# Patient Record
Sex: FEMALE | Race: WHITE | NOT HISPANIC OR LATINO
[De-identification: names, ages, dates, MRNs, and addresses within clinical notes are randomized per-mention and may not be internally consistent; named-entity substitution may affect disease eponyms.]

---

## 2020-02-01 ENCOUNTER — TRANSCRIPTION ENCOUNTER (OUTPATIENT)
Age: 85
End: 2020-02-01

## 2023-01-01 ENCOUNTER — TRANSCRIPTION ENCOUNTER (OUTPATIENT)
Age: 88
End: 2023-01-01

## 2023-01-01 ENCOUNTER — APPOINTMENT (OUTPATIENT)
Dept: CARDIOLOGY | Facility: CLINIC | Age: 88
End: 2023-01-01
Payer: MEDICARE

## 2023-01-01 ENCOUNTER — NON-APPOINTMENT (OUTPATIENT)
Age: 88
End: 2023-01-01

## 2023-01-01 ENCOUNTER — INPATIENT (INPATIENT)
Facility: HOSPITAL | Age: 88
LOS: 3 days | DRG: 291 | End: 2023-12-08
Attending: INTERNAL MEDICINE | Admitting: STUDENT IN AN ORGANIZED HEALTH CARE EDUCATION/TRAINING PROGRAM
Payer: MEDICARE

## 2023-01-01 ENCOUNTER — APPOINTMENT (OUTPATIENT)
Dept: CARE COORDINATION | Facility: HOME HEALTH | Age: 88
End: 2023-01-01
Payer: MEDICARE

## 2023-01-01 ENCOUNTER — INPATIENT (INPATIENT)
Facility: HOSPITAL | Age: 88
LOS: 3 days | Discharge: HOME HEALTH | DRG: 291 | End: 2023-11-03
Attending: STUDENT IN AN ORGANIZED HEALTH CARE EDUCATION/TRAINING PROGRAM | Admitting: STUDENT IN AN ORGANIZED HEALTH CARE EDUCATION/TRAINING PROGRAM
Payer: MEDICARE

## 2023-01-01 VITALS
WEIGHT: 112 LBS | HEIGHT: 58 IN | OXYGEN SATURATION: 95 % | TEMPERATURE: 98.3 F | SYSTOLIC BLOOD PRESSURE: 118 MMHG | RESPIRATION RATE: 18 BRPM | BODY MASS INDEX: 23.51 KG/M2 | DIASTOLIC BLOOD PRESSURE: 60 MMHG | HEART RATE: 70 BPM

## 2023-01-01 VITALS
RESPIRATION RATE: 18 BRPM | HEART RATE: 97 BPM | OXYGEN SATURATION: 91 % | DIASTOLIC BLOOD PRESSURE: 78 MMHG | WEIGHT: 115.08 LBS | HEIGHT: 58 IN | SYSTOLIC BLOOD PRESSURE: 131 MMHG

## 2023-01-01 VITALS
OXYGEN SATURATION: 97 % | RESPIRATION RATE: 18 BRPM | SYSTOLIC BLOOD PRESSURE: 110 MMHG | WEIGHT: 113 LBS | DIASTOLIC BLOOD PRESSURE: 62 MMHG | TEMPERATURE: 98 F | HEART RATE: 80 BPM | HEIGHT: 58 IN | BODY MASS INDEX: 23.72 KG/M2

## 2023-01-01 VITALS
HEART RATE: 78 BPM | HEIGHT: 58 IN | BODY MASS INDEX: 23.72 KG/M2 | SYSTOLIC BLOOD PRESSURE: 111 MMHG | OXYGEN SATURATION: 87 % | DIASTOLIC BLOOD PRESSURE: 68 MMHG | WEIGHT: 113 LBS

## 2023-01-01 VITALS
HEIGHT: 58 IN | SYSTOLIC BLOOD PRESSURE: 139 MMHG | OXYGEN SATURATION: 97 % | TEMPERATURE: 99 F | DIASTOLIC BLOOD PRESSURE: 77 MMHG | RESPIRATION RATE: 18 BRPM | HEART RATE: 75 BPM

## 2023-01-01 VITALS — OXYGEN SATURATION: 89 % | DIASTOLIC BLOOD PRESSURE: 68 MMHG | SYSTOLIC BLOOD PRESSURE: 111 MMHG | HEART RATE: 78 BPM

## 2023-01-01 VITALS — RESPIRATION RATE: 20 BRPM | HEART RATE: 102 BPM | OXYGEN SATURATION: 93 %

## 2023-01-01 VITALS — RESPIRATION RATE: 18 BRPM | OXYGEN SATURATION: 93 %

## 2023-01-01 DIAGNOSIS — H26.9 UNSPECIFIED CATARACT: ICD-10-CM

## 2023-01-01 DIAGNOSIS — I10 ESSENTIAL (PRIMARY) HYPERTENSION: ICD-10-CM

## 2023-01-01 DIAGNOSIS — I50.9 HEART FAILURE, UNSPECIFIED: ICD-10-CM

## 2023-01-01 DIAGNOSIS — R00.1 BRADYCARDIA, UNSPECIFIED: ICD-10-CM

## 2023-01-01 DIAGNOSIS — E78.5 HYPERLIPIDEMIA, UNSPECIFIED: ICD-10-CM

## 2023-01-01 DIAGNOSIS — Z95.0 PRESENCE OF CARDIAC PACEMAKER: Chronic | ICD-10-CM

## 2023-01-01 DIAGNOSIS — G93.41 METABOLIC ENCEPHALOPATHY: ICD-10-CM

## 2023-01-01 DIAGNOSIS — I35.1 NONRHEUMATIC AORTIC (VALVE) INSUFFICIENCY: ICD-10-CM

## 2023-01-01 DIAGNOSIS — H54.7 UNSPECIFIED VISUAL LOSS: ICD-10-CM

## 2023-01-01 DIAGNOSIS — I27.20 PULMONARY HYPERTENSION, UNSPECIFIED: ICD-10-CM

## 2023-01-01 DIAGNOSIS — F03.90 UNSPECIFIED DEMENTIA W/OUT BEHAVIORAL DISTURBANCE: ICD-10-CM

## 2023-01-01 DIAGNOSIS — Z29.9 ENCOUNTER FOR PROPHYLACTIC MEASURES, UNSPECIFIED: ICD-10-CM

## 2023-01-01 DIAGNOSIS — R60.0 LOCALIZED EDEMA: ICD-10-CM

## 2023-01-01 DIAGNOSIS — J96.01 ACUTE RESPIRATORY FAILURE WITH HYPOXIA: ICD-10-CM

## 2023-01-01 DIAGNOSIS — Z51.5 ENCOUNTER FOR PALLIATIVE CARE: ICD-10-CM

## 2023-01-01 DIAGNOSIS — I50.23 ACUTE ON CHRONIC SYSTOLIC (CONGESTIVE) HEART FAILURE: ICD-10-CM

## 2023-01-01 DIAGNOSIS — Z99.81 DEPENDENCE ON SUPPLEMENTAL OXYGEN: ICD-10-CM

## 2023-01-01 DIAGNOSIS — I05.0 RHEUMATIC MITRAL STENOSIS: ICD-10-CM

## 2023-01-01 DIAGNOSIS — I50.22 CHRONIC SYSTOLIC (CONGESTIVE) HEART FAILURE: ICD-10-CM

## 2023-01-01 DIAGNOSIS — I11.0 HYPERTENSIVE HEART DISEASE WITH HEART FAILURE: ICD-10-CM

## 2023-01-01 DIAGNOSIS — F03.90 UNSPECIFIED DEMENTIA WITHOUT BEHAVIORAL DISTURBANCE: ICD-10-CM

## 2023-01-01 DIAGNOSIS — R64 CACHEXIA: ICD-10-CM

## 2023-01-01 DIAGNOSIS — Z71.89 OTHER SPECIFIED COUNSELING: ICD-10-CM

## 2023-01-01 DIAGNOSIS — Z87.898 PERSONAL HISTORY OF OTHER SPECIFIED CONDITIONS: ICD-10-CM

## 2023-01-01 DIAGNOSIS — R53.2 FUNCTIONAL QUADRIPLEGIA: ICD-10-CM

## 2023-01-01 LAB
ALBUMIN SERPL ELPH-MCNC: 3.4 G/DL — SIGNIFICANT CHANGE UP (ref 3.3–5)
ALBUMIN SERPL ELPH-MCNC: 3.4 G/DL — SIGNIFICANT CHANGE UP (ref 3.3–5)
ALBUMIN SERPL ELPH-MCNC: 3.8 G/DL — SIGNIFICANT CHANGE UP (ref 3.3–5)
ALBUMIN SERPL ELPH-MCNC: 3.8 G/DL — SIGNIFICANT CHANGE UP (ref 3.3–5)
ALP SERPL-CCNC: 132 U/L — HIGH (ref 40–120)
ALP SERPL-CCNC: 132 U/L — HIGH (ref 40–120)
ALP SERPL-CCNC: 94 U/L — SIGNIFICANT CHANGE UP (ref 40–120)
ALP SERPL-CCNC: 94 U/L — SIGNIFICANT CHANGE UP (ref 40–120)
ALT FLD-CCNC: 14 U/L — SIGNIFICANT CHANGE UP (ref 10–45)
ALT FLD-CCNC: 14 U/L — SIGNIFICANT CHANGE UP (ref 10–45)
ALT FLD-CCNC: 32 U/L — SIGNIFICANT CHANGE UP (ref 10–45)
ALT FLD-CCNC: 32 U/L — SIGNIFICANT CHANGE UP (ref 10–45)
ANION GAP SERPL CALC-SCNC: 10 MMOL/L — SIGNIFICANT CHANGE UP (ref 5–17)
ANION GAP SERPL CALC-SCNC: 10 MMOL/L — SIGNIFICANT CHANGE UP (ref 5–17)
ANION GAP SERPL CALC-SCNC: 14 MMOL/L — SIGNIFICANT CHANGE UP (ref 5–17)
ANION GAP SERPL CALC-SCNC: 19 MMOL/L — HIGH (ref 5–17)
ANION GAP SERPL CALC-SCNC: 19 MMOL/L — HIGH (ref 5–17)
APTT BLD: 26.6 SEC — SIGNIFICANT CHANGE UP (ref 24.5–35.6)
APTT BLD: 26.6 SEC — SIGNIFICANT CHANGE UP (ref 24.5–35.6)
APTT BLD: 28.5 SEC — SIGNIFICANT CHANGE UP (ref 24.5–35.6)
APTT BLD: 28.5 SEC — SIGNIFICANT CHANGE UP (ref 24.5–35.6)
AST SERPL-CCNC: 13 U/L — SIGNIFICANT CHANGE UP (ref 10–40)
AST SERPL-CCNC: 13 U/L — SIGNIFICANT CHANGE UP (ref 10–40)
AST SERPL-CCNC: 18 U/L — SIGNIFICANT CHANGE UP (ref 10–40)
AST SERPL-CCNC: 18 U/L — SIGNIFICANT CHANGE UP (ref 10–40)
BASOPHILS # BLD AUTO: 0.03 K/UL — SIGNIFICANT CHANGE UP (ref 0–0.2)
BASOPHILS # BLD AUTO: 0.05 K/UL — SIGNIFICANT CHANGE UP (ref 0–0.2)
BASOPHILS # BLD AUTO: 0.05 K/UL — SIGNIFICANT CHANGE UP (ref 0–0.2)
BASOPHILS NFR BLD AUTO: 0.3 % — SIGNIFICANT CHANGE UP (ref 0–2)
BASOPHILS NFR BLD AUTO: 0.7 % — SIGNIFICANT CHANGE UP (ref 0–2)
BASOPHILS NFR BLD AUTO: 0.7 % — SIGNIFICANT CHANGE UP (ref 0–2)
BILIRUB SERPL-MCNC: 0.8 MG/DL — SIGNIFICANT CHANGE UP (ref 0.2–1.2)
BILIRUB SERPL-MCNC: 0.8 MG/DL — SIGNIFICANT CHANGE UP (ref 0.2–1.2)
BILIRUB SERPL-MCNC: 1.1 MG/DL — SIGNIFICANT CHANGE UP (ref 0.2–1.2)
BILIRUB SERPL-MCNC: 1.1 MG/DL — SIGNIFICANT CHANGE UP (ref 0.2–1.2)
BUN SERPL-MCNC: 18 MG/DL — SIGNIFICANT CHANGE UP (ref 7–23)
BUN SERPL-MCNC: 18 MG/DL — SIGNIFICANT CHANGE UP (ref 7–23)
BUN SERPL-MCNC: 36 MG/DL — HIGH (ref 7–23)
BUN SERPL-MCNC: 36 MG/DL — HIGH (ref 7–23)
BUN SERPL-MCNC: 39 MG/DL — HIGH (ref 7–23)
BUN SERPL-MCNC: 39 MG/DL — HIGH (ref 7–23)
BUN SERPL-MCNC: 43 MG/DL — HIGH (ref 7–23)
CALCIUM SERPL-MCNC: 8.8 MG/DL — SIGNIFICANT CHANGE UP (ref 8.4–10.5)
CALCIUM SERPL-MCNC: 8.8 MG/DL — SIGNIFICANT CHANGE UP (ref 8.4–10.5)
CALCIUM SERPL-MCNC: 9.2 MG/DL — SIGNIFICANT CHANGE UP (ref 8.4–10.5)
CALCIUM SERPL-MCNC: 9.2 MG/DL — SIGNIFICANT CHANGE UP (ref 8.4–10.5)
CALCIUM SERPL-MCNC: 9.4 MG/DL — SIGNIFICANT CHANGE UP (ref 8.4–10.5)
CALCIUM SERPL-MCNC: 9.6 MG/DL — SIGNIFICANT CHANGE UP (ref 8.4–10.5)
CALCIUM SERPL-MCNC: 9.6 MG/DL — SIGNIFICANT CHANGE UP (ref 8.4–10.5)
CHLORIDE SERPL-SCNC: 100 MMOL/L — SIGNIFICANT CHANGE UP (ref 96–108)
CHLORIDE SERPL-SCNC: 100 MMOL/L — SIGNIFICANT CHANGE UP (ref 96–108)
CHLORIDE SERPL-SCNC: 101 MMOL/L — SIGNIFICANT CHANGE UP (ref 96–108)
CHLORIDE SERPL-SCNC: 93 MMOL/L — LOW (ref 96–108)
CHLORIDE SERPL-SCNC: 93 MMOL/L — LOW (ref 96–108)
CHLORIDE SERPL-SCNC: 98 MMOL/L — SIGNIFICANT CHANGE UP (ref 96–108)
CHLORIDE SERPL-SCNC: 98 MMOL/L — SIGNIFICANT CHANGE UP (ref 96–108)
CK MB BLD-MCNC: 5 % — HIGH (ref 0–3.5)
CK MB BLD-MCNC: 5 % — HIGH (ref 0–3.5)
CK MB CFR SERPL CALC: 6.4 NG/ML — HIGH (ref 0–3.8)
CK MB CFR SERPL CALC: 6.4 NG/ML — HIGH (ref 0–3.8)
CK SERPL-CCNC: 129 U/L — SIGNIFICANT CHANGE UP (ref 25–170)
CK SERPL-CCNC: 129 U/L — SIGNIFICANT CHANGE UP (ref 25–170)
CO2 SERPL-SCNC: 21 MMOL/L — LOW (ref 22–31)
CO2 SERPL-SCNC: 21 MMOL/L — LOW (ref 22–31)
CO2 SERPL-SCNC: 22 MMOL/L — SIGNIFICANT CHANGE UP (ref 22–31)
CO2 SERPL-SCNC: 31 MMOL/L — SIGNIFICANT CHANGE UP (ref 22–31)
CO2 SERPL-SCNC: 31 MMOL/L — SIGNIFICANT CHANGE UP (ref 22–31)
CREAT SERPL-MCNC: 0.88 MG/DL — SIGNIFICANT CHANGE UP (ref 0.5–1.3)
CREAT SERPL-MCNC: 0.88 MG/DL — SIGNIFICANT CHANGE UP (ref 0.5–1.3)
CREAT SERPL-MCNC: 0.97 MG/DL — SIGNIFICANT CHANGE UP (ref 0.5–1.3)
CREAT SERPL-MCNC: 0.97 MG/DL — SIGNIFICANT CHANGE UP (ref 0.5–1.3)
CREAT SERPL-MCNC: 1 MG/DL — SIGNIFICANT CHANGE UP (ref 0.5–1.3)
CREAT SERPL-MCNC: 1 MG/DL — SIGNIFICANT CHANGE UP (ref 0.5–1.3)
CREAT SERPL-MCNC: 1.04 MG/DL — SIGNIFICANT CHANGE UP (ref 0.5–1.3)
CREAT SERPL-MCNC: 1.04 MG/DL — SIGNIFICANT CHANGE UP (ref 0.5–1.3)
CREAT SERPL-MCNC: 1.09 MG/DL — SIGNIFICANT CHANGE UP (ref 0.5–1.3)
CREAT SERPL-MCNC: 1.09 MG/DL — SIGNIFICANT CHANGE UP (ref 0.5–1.3)
CULTURE RESULTS: SIGNIFICANT CHANGE UP
EGFR: 46 ML/MIN/1.73M2 — LOW
EGFR: 46 ML/MIN/1.73M2 — LOW
EGFR: 49 ML/MIN/1.73M2 — LOW
EGFR: 49 ML/MIN/1.73M2 — LOW
EGFR: 52 ML/MIN/1.73M2 — LOW
EGFR: 52 ML/MIN/1.73M2 — LOW
EGFR: 53 ML/MIN/1.73M2 — LOW
EGFR: 53 ML/MIN/1.73M2 — LOW
EGFR: 60 ML/MIN/1.73M2 — SIGNIFICANT CHANGE UP
EGFR: 60 ML/MIN/1.73M2 — SIGNIFICANT CHANGE UP
EOSINOPHIL # BLD AUTO: 0.04 K/UL — SIGNIFICANT CHANGE UP (ref 0–0.5)
EOSINOPHIL # BLD AUTO: 0.04 K/UL — SIGNIFICANT CHANGE UP (ref 0–0.5)
EOSINOPHIL # BLD AUTO: 0.18 K/UL — SIGNIFICANT CHANGE UP (ref 0–0.5)
EOSINOPHIL # BLD AUTO: 0.18 K/UL — SIGNIFICANT CHANGE UP (ref 0–0.5)
EOSINOPHIL # BLD AUTO: 0.25 K/UL — SIGNIFICANT CHANGE UP (ref 0–0.5)
EOSINOPHIL # BLD AUTO: 0.25 K/UL — SIGNIFICANT CHANGE UP (ref 0–0.5)
EOSINOPHIL NFR BLD AUTO: 0.4 % — SIGNIFICANT CHANGE UP (ref 0–6)
EOSINOPHIL NFR BLD AUTO: 0.4 % — SIGNIFICANT CHANGE UP (ref 0–6)
EOSINOPHIL NFR BLD AUTO: 2.2 % — SIGNIFICANT CHANGE UP (ref 0–6)
EOSINOPHIL NFR BLD AUTO: 2.2 % — SIGNIFICANT CHANGE UP (ref 0–6)
EOSINOPHIL NFR BLD AUTO: 2.5 % — SIGNIFICANT CHANGE UP (ref 0–6)
EOSINOPHIL NFR BLD AUTO: 2.5 % — SIGNIFICANT CHANGE UP (ref 0–6)
FLUAV AG NPH QL: SIGNIFICANT CHANGE UP
FLUAV AG NPH QL: SIGNIFICANT CHANGE UP
FLUBV AG NPH QL: SIGNIFICANT CHANGE UP
FLUBV AG NPH QL: SIGNIFICANT CHANGE UP
GAS PNL BLDV: SIGNIFICANT CHANGE UP
GAS PNL BLDV: SIGNIFICANT CHANGE UP
GLUCOSE BLDC GLUCOMTR-MCNC: 153 MG/DL — HIGH (ref 70–99)
GLUCOSE BLDC GLUCOMTR-MCNC: 153 MG/DL — HIGH (ref 70–99)
GLUCOSE SERPL-MCNC: 119 MG/DL — HIGH (ref 70–99)
GLUCOSE SERPL-MCNC: 119 MG/DL — HIGH (ref 70–99)
GLUCOSE SERPL-MCNC: 123 MG/DL — HIGH (ref 70–99)
GLUCOSE SERPL-MCNC: 123 MG/DL — HIGH (ref 70–99)
GLUCOSE SERPL-MCNC: 148 MG/DL — HIGH (ref 70–99)
GLUCOSE SERPL-MCNC: 148 MG/DL — HIGH (ref 70–99)
GLUCOSE SERPL-MCNC: 155 MG/DL — HIGH (ref 70–99)
GLUCOSE SERPL-MCNC: 155 MG/DL — HIGH (ref 70–99)
GLUCOSE SERPL-MCNC: 170 MG/DL — HIGH (ref 70–99)
GLUCOSE SERPL-MCNC: 170 MG/DL — HIGH (ref 70–99)
HCT VFR BLD CALC: 29.7 % — LOW (ref 34.5–45)
HCT VFR BLD CALC: 29.7 % — LOW (ref 34.5–45)
HCT VFR BLD CALC: 29.8 % — LOW (ref 34.5–45)
HCT VFR BLD CALC: 29.8 % — LOW (ref 34.5–45)
HCT VFR BLD CALC: 30.7 % — LOW (ref 34.5–45)
HCT VFR BLD CALC: 30.7 % — LOW (ref 34.5–45)
HCT VFR BLD CALC: 36.5 % — SIGNIFICANT CHANGE UP (ref 34.5–45)
HCT VFR BLD CALC: 36.5 % — SIGNIFICANT CHANGE UP (ref 34.5–45)
HGB BLD-MCNC: 10 G/DL — LOW (ref 11.5–15.5)
HGB BLD-MCNC: 10 G/DL — LOW (ref 11.5–15.5)
HGB BLD-MCNC: 11.8 G/DL — SIGNIFICANT CHANGE UP (ref 11.5–15.5)
HGB BLD-MCNC: 11.8 G/DL — SIGNIFICANT CHANGE UP (ref 11.5–15.5)
HGB BLD-MCNC: 9.7 G/DL — LOW (ref 11.5–15.5)
HGB BLD-MCNC: 9.7 G/DL — LOW (ref 11.5–15.5)
HGB BLD-MCNC: 9.8 G/DL — LOW (ref 11.5–15.5)
HGB BLD-MCNC: 9.8 G/DL — LOW (ref 11.5–15.5)
IMM GRANULOCYTES NFR BLD AUTO: 0.7 % — SIGNIFICANT CHANGE UP (ref 0–0.9)
IMM GRANULOCYTES NFR BLD AUTO: 0.7 % — SIGNIFICANT CHANGE UP (ref 0–0.9)
IMM GRANULOCYTES NFR BLD AUTO: 0.8 % — SIGNIFICANT CHANGE UP (ref 0–0.9)
IMM GRANULOCYTES NFR BLD AUTO: 0.8 % — SIGNIFICANT CHANGE UP (ref 0–0.9)
IMM GRANULOCYTES NFR BLD AUTO: 0.9 % — SIGNIFICANT CHANGE UP (ref 0–0.9)
IMM GRANULOCYTES NFR BLD AUTO: 0.9 % — SIGNIFICANT CHANGE UP (ref 0–0.9)
INR BLD: 1.1 RATIO — SIGNIFICANT CHANGE UP (ref 0.85–1.18)
INR BLD: 1.1 RATIO — SIGNIFICANT CHANGE UP (ref 0.85–1.18)
INR BLD: 1.15 RATIO — SIGNIFICANT CHANGE UP (ref 0.85–1.18)
INR BLD: 1.15 RATIO — SIGNIFICANT CHANGE UP (ref 0.85–1.18)
LYMPHOCYTES # BLD AUTO: 0.6 K/UL — LOW (ref 1–3.3)
LYMPHOCYTES # BLD AUTO: 0.6 K/UL — LOW (ref 1–3.3)
LYMPHOCYTES # BLD AUTO: 0.95 K/UL — LOW (ref 1–3.3)
LYMPHOCYTES # BLD AUTO: 0.95 K/UL — LOW (ref 1–3.3)
LYMPHOCYTES # BLD AUTO: 0.96 K/UL — LOW (ref 1–3.3)
LYMPHOCYTES # BLD AUTO: 0.96 K/UL — LOW (ref 1–3.3)
LYMPHOCYTES # BLD AUTO: 13 % — SIGNIFICANT CHANGE UP (ref 13–44)
LYMPHOCYTES # BLD AUTO: 13 % — SIGNIFICANT CHANGE UP (ref 13–44)
LYMPHOCYTES # BLD AUTO: 5.4 % — LOW (ref 13–44)
LYMPHOCYTES # BLD AUTO: 5.4 % — LOW (ref 13–44)
LYMPHOCYTES # BLD AUTO: 9.1 % — LOW (ref 13–44)
LYMPHOCYTES # BLD AUTO: 9.1 % — LOW (ref 13–44)
MAGNESIUM SERPL-MCNC: 2.2 MG/DL — SIGNIFICANT CHANGE UP (ref 1.6–2.6)
MAGNESIUM SERPL-MCNC: 2.2 MG/DL — SIGNIFICANT CHANGE UP (ref 1.6–2.6)
MAGNESIUM SERPL-MCNC: 2.4 MG/DL — SIGNIFICANT CHANGE UP (ref 1.6–2.6)
MAGNESIUM SERPL-MCNC: 2.4 MG/DL — SIGNIFICANT CHANGE UP (ref 1.6–2.6)
MAGNESIUM SERPL-MCNC: 2.5 MG/DL — SIGNIFICANT CHANGE UP (ref 1.6–2.6)
MCHC RBC-ENTMCNC: 29.3 PG — SIGNIFICANT CHANGE UP (ref 27–34)
MCHC RBC-ENTMCNC: 29.3 PG — SIGNIFICANT CHANGE UP (ref 27–34)
MCHC RBC-ENTMCNC: 30 PG — SIGNIFICANT CHANGE UP (ref 27–34)
MCHC RBC-ENTMCNC: 30 PG — SIGNIFICANT CHANGE UP (ref 27–34)
MCHC RBC-ENTMCNC: 30.9 PG — SIGNIFICANT CHANGE UP (ref 27–34)
MCHC RBC-ENTMCNC: 30.9 PG — SIGNIFICANT CHANGE UP (ref 27–34)
MCHC RBC-ENTMCNC: 31.4 PG — SIGNIFICANT CHANGE UP (ref 27–34)
MCHC RBC-ENTMCNC: 31.4 PG — SIGNIFICANT CHANGE UP (ref 27–34)
MCHC RBC-ENTMCNC: 31.9 GM/DL — LOW (ref 32–36)
MCHC RBC-ENTMCNC: 31.9 GM/DL — LOW (ref 32–36)
MCHC RBC-ENTMCNC: 32.3 GM/DL — SIGNIFICANT CHANGE UP (ref 32–36)
MCHC RBC-ENTMCNC: 32.3 GM/DL — SIGNIFICANT CHANGE UP (ref 32–36)
MCHC RBC-ENTMCNC: 32.7 GM/DL — SIGNIFICANT CHANGE UP (ref 32–36)
MCHC RBC-ENTMCNC: 32.7 GM/DL — SIGNIFICANT CHANGE UP (ref 32–36)
MCHC RBC-ENTMCNC: 33.6 GM/DL — SIGNIFICANT CHANGE UP (ref 32–36)
MCHC RBC-ENTMCNC: 33.6 GM/DL — SIGNIFICANT CHANGE UP (ref 32–36)
MCV RBC AUTO: 91.9 FL — SIGNIFICANT CHANGE UP (ref 80–100)
MCV RBC AUTO: 91.9 FL — SIGNIFICANT CHANGE UP (ref 80–100)
MCV RBC AUTO: 92.9 FL — SIGNIFICANT CHANGE UP (ref 80–100)
MCV RBC AUTO: 92.9 FL — SIGNIFICANT CHANGE UP (ref 80–100)
MCV RBC AUTO: 93.7 FL — SIGNIFICANT CHANGE UP (ref 80–100)
MCV RBC AUTO: 93.7 FL — SIGNIFICANT CHANGE UP (ref 80–100)
MCV RBC AUTO: 94.6 FL — SIGNIFICANT CHANGE UP (ref 80–100)
MCV RBC AUTO: 94.6 FL — SIGNIFICANT CHANGE UP (ref 80–100)
MONOCYTES # BLD AUTO: 0.69 K/UL — SIGNIFICANT CHANGE UP (ref 0–0.9)
MONOCYTES # BLD AUTO: 0.69 K/UL — SIGNIFICANT CHANGE UP (ref 0–0.9)
MONOCYTES # BLD AUTO: 0.8 K/UL — SIGNIFICANT CHANGE UP (ref 0–0.9)
MONOCYTES # BLD AUTO: 0.8 K/UL — SIGNIFICANT CHANGE UP (ref 0–0.9)
MONOCYTES # BLD AUTO: 0.82 K/UL — SIGNIFICANT CHANGE UP (ref 0–0.9)
MONOCYTES # BLD AUTO: 0.82 K/UL — SIGNIFICANT CHANGE UP (ref 0–0.9)
MONOCYTES NFR BLD AUTO: 11 % — SIGNIFICANT CHANGE UP (ref 2–14)
MONOCYTES NFR BLD AUTO: 11 % — SIGNIFICANT CHANGE UP (ref 2–14)
MONOCYTES NFR BLD AUTO: 6.5 % — SIGNIFICANT CHANGE UP (ref 2–14)
MONOCYTES NFR BLD AUTO: 6.5 % — SIGNIFICANT CHANGE UP (ref 2–14)
MONOCYTES NFR BLD AUTO: 7.3 % — SIGNIFICANT CHANGE UP (ref 2–14)
MONOCYTES NFR BLD AUTO: 7.3 % — SIGNIFICANT CHANGE UP (ref 2–14)
MRSA PCR RESULT.: SIGNIFICANT CHANGE UP
MRSA PCR RESULT.: SIGNIFICANT CHANGE UP
NEUTROPHILS # BLD AUTO: 5.27 K/UL — SIGNIFICANT CHANGE UP (ref 1.8–7.4)
NEUTROPHILS # BLD AUTO: 5.27 K/UL — SIGNIFICANT CHANGE UP (ref 1.8–7.4)
NEUTROPHILS # BLD AUTO: 8.74 K/UL — HIGH (ref 1.8–7.4)
NEUTROPHILS # BLD AUTO: 8.74 K/UL — HIGH (ref 1.8–7.4)
NEUTROPHILS # BLD AUTO: 9.41 K/UL — HIGH (ref 1.8–7.4)
NEUTROPHILS # BLD AUTO: 9.41 K/UL — HIGH (ref 1.8–7.4)
NEUTROPHILS NFR BLD AUTO: 72.1 % — SIGNIFICANT CHANGE UP (ref 43–77)
NEUTROPHILS NFR BLD AUTO: 72.1 % — SIGNIFICANT CHANGE UP (ref 43–77)
NEUTROPHILS NFR BLD AUTO: 82.8 % — HIGH (ref 43–77)
NEUTROPHILS NFR BLD AUTO: 82.8 % — HIGH (ref 43–77)
NEUTROPHILS NFR BLD AUTO: 84 % — HIGH (ref 43–77)
NEUTROPHILS NFR BLD AUTO: 84 % — HIGH (ref 43–77)
NRBC # BLD: 0 /100 WBCS — SIGNIFICANT CHANGE UP (ref 0–0)
NT-PROBNP SERPL-SCNC: HIGH PG/ML (ref 0–300)
PHOSPHATE SERPL-MCNC: 3.1 MG/DL — SIGNIFICANT CHANGE UP (ref 2.5–4.5)
PHOSPHATE SERPL-MCNC: 3.5 MG/DL — SIGNIFICANT CHANGE UP (ref 2.5–4.5)
PHOSPHATE SERPL-MCNC: 3.5 MG/DL — SIGNIFICANT CHANGE UP (ref 2.5–4.5)
PLATELET # BLD AUTO: 211 K/UL — SIGNIFICANT CHANGE UP (ref 150–400)
PLATELET # BLD AUTO: 211 K/UL — SIGNIFICANT CHANGE UP (ref 150–400)
PLATELET # BLD AUTO: 262 K/UL — SIGNIFICANT CHANGE UP (ref 150–400)
PLATELET # BLD AUTO: 262 K/UL — SIGNIFICANT CHANGE UP (ref 150–400)
PLATELET # BLD AUTO: 272 K/UL — SIGNIFICANT CHANGE UP (ref 150–400)
PLATELET # BLD AUTO: 272 K/UL — SIGNIFICANT CHANGE UP (ref 150–400)
PLATELET # BLD AUTO: 287 K/UL — SIGNIFICANT CHANGE UP (ref 150–400)
PLATELET # BLD AUTO: 287 K/UL — SIGNIFICANT CHANGE UP (ref 150–400)
POTASSIUM SERPL-MCNC: 3.4 MMOL/L — LOW (ref 3.5–5.3)
POTASSIUM SERPL-MCNC: 3.4 MMOL/L — LOW (ref 3.5–5.3)
POTASSIUM SERPL-MCNC: 3.9 MMOL/L — SIGNIFICANT CHANGE UP (ref 3.5–5.3)
POTASSIUM SERPL-MCNC: 3.9 MMOL/L — SIGNIFICANT CHANGE UP (ref 3.5–5.3)
POTASSIUM SERPL-MCNC: 4.3 MMOL/L — SIGNIFICANT CHANGE UP (ref 3.5–5.3)
POTASSIUM SERPL-MCNC: 4.3 MMOL/L — SIGNIFICANT CHANGE UP (ref 3.5–5.3)
POTASSIUM SERPL-MCNC: 4.5 MMOL/L — SIGNIFICANT CHANGE UP (ref 3.5–5.3)
POTASSIUM SERPL-MCNC: 4.5 MMOL/L — SIGNIFICANT CHANGE UP (ref 3.5–5.3)
POTASSIUM SERPL-MCNC: 4.6 MMOL/L — SIGNIFICANT CHANGE UP (ref 3.5–5.3)
POTASSIUM SERPL-MCNC: 4.6 MMOL/L — SIGNIFICANT CHANGE UP (ref 3.5–5.3)
POTASSIUM SERPL-SCNC: 3.4 MMOL/L — LOW (ref 3.5–5.3)
POTASSIUM SERPL-SCNC: 3.4 MMOL/L — LOW (ref 3.5–5.3)
POTASSIUM SERPL-SCNC: 3.9 MMOL/L — SIGNIFICANT CHANGE UP (ref 3.5–5.3)
POTASSIUM SERPL-SCNC: 3.9 MMOL/L — SIGNIFICANT CHANGE UP (ref 3.5–5.3)
POTASSIUM SERPL-SCNC: 4.3 MMOL/L — SIGNIFICANT CHANGE UP (ref 3.5–5.3)
POTASSIUM SERPL-SCNC: 4.3 MMOL/L — SIGNIFICANT CHANGE UP (ref 3.5–5.3)
POTASSIUM SERPL-SCNC: 4.5 MMOL/L — SIGNIFICANT CHANGE UP (ref 3.5–5.3)
POTASSIUM SERPL-SCNC: 4.5 MMOL/L — SIGNIFICANT CHANGE UP (ref 3.5–5.3)
POTASSIUM SERPL-SCNC: 4.6 MMOL/L — SIGNIFICANT CHANGE UP (ref 3.5–5.3)
POTASSIUM SERPL-SCNC: 4.6 MMOL/L — SIGNIFICANT CHANGE UP (ref 3.5–5.3)
PROT SERPL-MCNC: 6.8 G/DL — SIGNIFICANT CHANGE UP (ref 6–8.3)
PROT SERPL-MCNC: 6.8 G/DL — SIGNIFICANT CHANGE UP (ref 6–8.3)
PROT SERPL-MCNC: 7.6 G/DL — SIGNIFICANT CHANGE UP (ref 6–8.3)
PROT SERPL-MCNC: 7.6 G/DL — SIGNIFICANT CHANGE UP (ref 6–8.3)
PROTHROM AB SERPL-ACNC: 11.5 SEC — SIGNIFICANT CHANGE UP (ref 9.5–13)
PROTHROM AB SERPL-ACNC: 11.5 SEC — SIGNIFICANT CHANGE UP (ref 9.5–13)
PROTHROM AB SERPL-ACNC: 12 SEC — SIGNIFICANT CHANGE UP (ref 9.5–13)
PROTHROM AB SERPL-ACNC: 12 SEC — SIGNIFICANT CHANGE UP (ref 9.5–13)
RBC # BLD: 3.14 M/UL — LOW (ref 3.8–5.2)
RBC # BLD: 3.14 M/UL — LOW (ref 3.8–5.2)
RBC # BLD: 3.18 M/UL — LOW (ref 3.8–5.2)
RBC # BLD: 3.18 M/UL — LOW (ref 3.8–5.2)
RBC # BLD: 3.34 M/UL — LOW (ref 3.8–5.2)
RBC # BLD: 3.34 M/UL — LOW (ref 3.8–5.2)
RBC # BLD: 3.93 M/UL — SIGNIFICANT CHANGE UP (ref 3.8–5.2)
RBC # BLD: 3.93 M/UL — SIGNIFICANT CHANGE UP (ref 3.8–5.2)
RBC # FLD: 13.8 % — SIGNIFICANT CHANGE UP (ref 10.3–14.5)
RBC # FLD: 13.8 % — SIGNIFICANT CHANGE UP (ref 10.3–14.5)
RBC # FLD: 14.5 % — SIGNIFICANT CHANGE UP (ref 10.3–14.5)
RBC # FLD: 14.8 % — HIGH (ref 10.3–14.5)
RBC # FLD: 14.8 % — HIGH (ref 10.3–14.5)
RSV RNA NPH QL NAA+NON-PROBE: SIGNIFICANT CHANGE UP
RSV RNA NPH QL NAA+NON-PROBE: SIGNIFICANT CHANGE UP
S AUREUS DNA NOSE QL NAA+PROBE: SIGNIFICANT CHANGE UP
S AUREUS DNA NOSE QL NAA+PROBE: SIGNIFICANT CHANGE UP
SARS-COV-2 RNA SPEC QL NAA+PROBE: SIGNIFICANT CHANGE UP
SARS-COV-2 RNA SPEC QL NAA+PROBE: SIGNIFICANT CHANGE UP
SODIUM SERPL-SCNC: 134 MMOL/L — LOW (ref 135–145)
SODIUM SERPL-SCNC: 134 MMOL/L — LOW (ref 135–145)
SODIUM SERPL-SCNC: 135 MMOL/L — SIGNIFICANT CHANGE UP (ref 135–145)
SODIUM SERPL-SCNC: 135 MMOL/L — SIGNIFICANT CHANGE UP (ref 135–145)
SODIUM SERPL-SCNC: 137 MMOL/L — SIGNIFICANT CHANGE UP (ref 135–145)
SODIUM SERPL-SCNC: 139 MMOL/L — SIGNIFICANT CHANGE UP (ref 135–145)
SODIUM SERPL-SCNC: 139 MMOL/L — SIGNIFICANT CHANGE UP (ref 135–145)
SPECIMEN SOURCE: SIGNIFICANT CHANGE UP
TROPONIN T, HIGH SENSITIVITY RESULT: 1179 NG/L — HIGH (ref 0–51)
TROPONIN T, HIGH SENSITIVITY RESULT: 1179 NG/L — HIGH (ref 0–51)
TROPONIN T, HIGH SENSITIVITY RESULT: 1418 NG/L — HIGH (ref 0–51)
TROPONIN T, HIGH SENSITIVITY RESULT: 1418 NG/L — HIGH (ref 0–51)
TROPONIN T, HIGH SENSITIVITY RESULT: 68 NG/L — HIGH (ref 0–51)
TROPONIN T, HIGH SENSITIVITY RESULT: 68 NG/L — HIGH (ref 0–51)
WBC # BLD: 10.18 K/UL — SIGNIFICANT CHANGE UP (ref 3.8–10.5)
WBC # BLD: 10.18 K/UL — SIGNIFICANT CHANGE UP (ref 3.8–10.5)
WBC # BLD: 10.55 K/UL — HIGH (ref 3.8–10.5)
WBC # BLD: 10.55 K/UL — HIGH (ref 3.8–10.5)
WBC # BLD: 11.2 K/UL — HIGH (ref 3.8–10.5)
WBC # BLD: 11.2 K/UL — HIGH (ref 3.8–10.5)
WBC # BLD: 7.3 K/UL — SIGNIFICANT CHANGE UP (ref 3.8–10.5)
WBC # BLD: 7.3 K/UL — SIGNIFICANT CHANGE UP (ref 3.8–10.5)
WBC # FLD AUTO: 10.18 K/UL — SIGNIFICANT CHANGE UP (ref 3.8–10.5)
WBC # FLD AUTO: 10.18 K/UL — SIGNIFICANT CHANGE UP (ref 3.8–10.5)
WBC # FLD AUTO: 10.55 K/UL — HIGH (ref 3.8–10.5)
WBC # FLD AUTO: 10.55 K/UL — HIGH (ref 3.8–10.5)
WBC # FLD AUTO: 11.2 K/UL — HIGH (ref 3.8–10.5)
WBC # FLD AUTO: 11.2 K/UL — HIGH (ref 3.8–10.5)
WBC # FLD AUTO: 7.3 K/UL — SIGNIFICANT CHANGE UP (ref 3.8–10.5)
WBC # FLD AUTO: 7.3 K/UL — SIGNIFICANT CHANGE UP (ref 3.8–10.5)

## 2023-01-01 PROCEDURE — 83735 ASSAY OF MAGNESIUM: CPT

## 2023-01-01 PROCEDURE — 99285 EMERGENCY DEPT VISIT HI MDM: CPT

## 2023-01-01 PROCEDURE — 80048 BASIC METABOLIC PNL TOTAL CA: CPT

## 2023-01-01 PROCEDURE — 99348 HOME/RES VST EST LOW MDM 30: CPT

## 2023-01-01 PROCEDURE — 84295 ASSAY OF SERUM SODIUM: CPT

## 2023-01-01 PROCEDURE — 99233 SBSQ HOSP IP/OBS HIGH 50: CPT

## 2023-01-01 PROCEDURE — 97116 GAIT TRAINING THERAPY: CPT

## 2023-01-01 PROCEDURE — 87040 BLOOD CULTURE FOR BACTERIA: CPT

## 2023-01-01 PROCEDURE — 71275 CT ANGIOGRAPHY CHEST: CPT | Mod: 26,MD

## 2023-01-01 PROCEDURE — 99223 1ST HOSP IP/OBS HIGH 75: CPT

## 2023-01-01 PROCEDURE — 71045 X-RAY EXAM CHEST 1 VIEW: CPT

## 2023-01-01 PROCEDURE — 92526 ORAL FUNCTION THERAPY: CPT

## 2023-01-01 PROCEDURE — 71045 X-RAY EXAM CHEST 1 VIEW: CPT | Mod: 26

## 2023-01-01 PROCEDURE — 82947 ASSAY GLUCOSE BLOOD QUANT: CPT

## 2023-01-01 PROCEDURE — 80053 COMPREHEN METABOLIC PANEL: CPT

## 2023-01-01 PROCEDURE — 82330 ASSAY OF CALCIUM: CPT

## 2023-01-01 PROCEDURE — 85018 HEMOGLOBIN: CPT

## 2023-01-01 PROCEDURE — 94660 CPAP INITIATION&MGMT: CPT

## 2023-01-01 PROCEDURE — 82803 BLOOD GASES ANY COMBINATION: CPT

## 2023-01-01 PROCEDURE — 87641 MR-STAPH DNA AMP PROBE: CPT

## 2023-01-01 PROCEDURE — 99232 SBSQ HOSP IP/OBS MODERATE 35: CPT | Mod: GC

## 2023-01-01 PROCEDURE — 92610 EVALUATE SWALLOWING FUNCTION: CPT

## 2023-01-01 PROCEDURE — 84132 ASSAY OF SERUM POTASSIUM: CPT

## 2023-01-01 PROCEDURE — 93306 TTE W/DOPPLER COMPLETE: CPT | Mod: 26

## 2023-01-01 PROCEDURE — 84484 ASSAY OF TROPONIN QUANT: CPT

## 2023-01-01 PROCEDURE — 99291 CRITICAL CARE FIRST HOUR: CPT | Mod: GC

## 2023-01-01 PROCEDURE — 99239 HOSP IP/OBS DSCHRG MGMT >30: CPT

## 2023-01-01 PROCEDURE — 99495 TRANSJ CARE MGMT MOD F2F 14D: CPT

## 2023-01-01 PROCEDURE — 83880 ASSAY OF NATRIURETIC PEPTIDE: CPT

## 2023-01-01 PROCEDURE — 82550 ASSAY OF CK (CPK): CPT

## 2023-01-01 PROCEDURE — 83605 ASSAY OF LACTIC ACID: CPT

## 2023-01-01 PROCEDURE — 97161 PT EVAL LOW COMPLEX 20 MIN: CPT

## 2023-01-01 PROCEDURE — 96374 THER/PROPH/DIAG INJ IV PUSH: CPT

## 2023-01-01 PROCEDURE — 99232 SBSQ HOSP IP/OBS MODERATE 35: CPT

## 2023-01-01 PROCEDURE — 97530 THERAPEUTIC ACTIVITIES: CPT

## 2023-01-01 PROCEDURE — 87637 SARSCOV2&INF A&B&RSV AMP PRB: CPT

## 2023-01-01 PROCEDURE — 99215 OFFICE O/P EST HI 40 MIN: CPT

## 2023-01-01 PROCEDURE — 85027 COMPLETE CBC AUTOMATED: CPT

## 2023-01-01 PROCEDURE — 71275 CT ANGIOGRAPHY CHEST: CPT | Mod: MD

## 2023-01-01 PROCEDURE — 87640 STAPH A DNA AMP PROBE: CPT

## 2023-01-01 PROCEDURE — 82553 CREATINE MB FRACTION: CPT

## 2023-01-01 PROCEDURE — 85025 COMPLETE CBC W/AUTO DIFF WBC: CPT

## 2023-01-01 PROCEDURE — 84100 ASSAY OF PHOSPHORUS: CPT

## 2023-01-01 PROCEDURE — 97165 OT EVAL LOW COMPLEX 30 MIN: CPT

## 2023-01-01 PROCEDURE — 36415 COLL VENOUS BLD VENIPUNCTURE: CPT

## 2023-01-01 PROCEDURE — 93000 ELECTROCARDIOGRAM COMPLETE: CPT | Mod: PD

## 2023-01-01 PROCEDURE — 85730 THROMBOPLASTIN TIME PARTIAL: CPT

## 2023-01-01 PROCEDURE — 96375 TX/PRO/DX INJ NEW DRUG ADDON: CPT

## 2023-01-01 PROCEDURE — C8929: CPT

## 2023-01-01 PROCEDURE — 85014 HEMATOCRIT: CPT

## 2023-01-01 PROCEDURE — 85610 PROTHROMBIN TIME: CPT

## 2023-01-01 PROCEDURE — 82435 ASSAY OF BLOOD CHLORIDE: CPT

## 2023-01-01 RX ORDER — ENOXAPARIN SODIUM 100 MG/ML
40 INJECTION SUBCUTANEOUS EVERY 24 HOURS
Refills: 0 | Status: DISCONTINUED | OUTPATIENT
Start: 2023-01-01 | End: 2023-01-01

## 2023-01-01 RX ORDER — FUROSEMIDE 40 MG
40 TABLET ORAL ONCE
Refills: 0 | Status: COMPLETED | OUTPATIENT
Start: 2023-01-01 | End: 2023-01-01

## 2023-01-01 RX ORDER — FUROSEMIDE 40 MG
40 TABLET ORAL
Refills: 0 | Status: DISCONTINUED | OUTPATIENT
Start: 2023-01-01 | End: 2023-01-01

## 2023-01-01 RX ORDER — ASPIRIN/CALCIUM CARB/MAGNESIUM 324 MG
1 TABLET ORAL
Refills: 0 | DISCHARGE

## 2023-01-01 RX ORDER — POTASSIUM CHLORIDE 20 MEQ
40 PACKET (EA) ORAL ONCE
Refills: 0 | Status: COMPLETED | OUTPATIENT
Start: 2023-01-01 | End: 2023-01-01

## 2023-01-01 RX ORDER — FUROSEMIDE 40 MG/1
40 TABLET ORAL TWICE DAILY
Refills: 0 | Status: ACTIVE | COMMUNITY
Start: 2023-01-01

## 2023-01-01 RX ORDER — ENALAPRIL MALEATE 5 MG/1
5 TABLET ORAL DAILY
Refills: 0 | Status: ACTIVE | COMMUNITY
Start: 2023-01-01

## 2023-01-01 RX ORDER — ACETAMINOPHEN 500 MG
650 TABLET ORAL EVERY 6 HOURS
Refills: 0 | Status: DISCONTINUED | OUTPATIENT
Start: 2023-01-01 | End: 2023-01-01

## 2023-01-01 RX ORDER — FUROSEMIDE 40 MG
40 TABLET ORAL DAILY
Refills: 0 | Status: DISCONTINUED | OUTPATIENT
Start: 2023-01-01 | End: 2023-01-01

## 2023-01-01 RX ORDER — BUMETANIDE 0.25 MG/ML
2 INJECTION INTRAMUSCULAR; INTRAVENOUS ONCE
Refills: 0 | Status: COMPLETED | OUTPATIENT
Start: 2023-01-01 | End: 2023-01-01

## 2023-01-01 RX ORDER — ASPIRIN ENTERIC COATED TABLETS 81 MG 81 MG/1
81 TABLET, DELAYED RELEASE ORAL DAILY
Refills: 0 | Status: ACTIVE | COMMUNITY
Start: 2023-01-01

## 2023-01-01 RX ORDER — FUROSEMIDE 40 MG
1 TABLET ORAL
Qty: 60 | Refills: 0
Start: 2023-01-01 | End: 2023-01-01

## 2023-01-01 RX ORDER — LANOLIN ALCOHOL/MO/W.PET/CERES
3 CREAM (GRAM) TOPICAL AT BEDTIME
Refills: 0 | Status: DISCONTINUED | OUTPATIENT
Start: 2023-01-01 | End: 2023-01-01

## 2023-01-01 RX ORDER — ASPIRIN/CALCIUM CARB/MAGNESIUM 324 MG
81 TABLET ORAL DAILY
Refills: 0 | Status: DISCONTINUED | OUTPATIENT
Start: 2023-01-01 | End: 2023-01-01

## 2023-01-01 RX ORDER — AZITHROMYCIN 500 MG/1
500 TABLET, FILM COATED ORAL ONCE
Refills: 0 | Status: COMPLETED | OUTPATIENT
Start: 2023-01-01 | End: 2023-01-01

## 2023-01-01 RX ORDER — MORPHINE SULFATE 50 MG/1
2 CAPSULE, EXTENDED RELEASE ORAL
Refills: 0 | Status: DISCONTINUED | OUTPATIENT
Start: 2023-01-01 | End: 2023-01-01

## 2023-01-01 RX ORDER — CEFTRIAXONE 500 MG/1
1000 INJECTION, POWDER, FOR SOLUTION INTRAMUSCULAR; INTRAVENOUS ONCE
Refills: 0 | Status: COMPLETED | OUTPATIENT
Start: 2023-01-01 | End: 2023-01-01

## 2023-01-01 RX ORDER — FUROSEMIDE 40 MG
20 TABLET ORAL ONCE
Refills: 0 | Status: DISCONTINUED | OUTPATIENT
Start: 2023-01-01 | End: 2023-01-01

## 2023-01-01 RX ORDER — POTASSIUM CHLORIDE 20 MEQ
20 PACKET (EA) ORAL ONCE
Refills: 0 | Status: COMPLETED | OUTPATIENT
Start: 2023-01-01 | End: 2023-01-01

## 2023-01-01 RX ORDER — CARVEDILOL 12.5 MG/1
12.5 TABLET, FILM COATED ORAL TWICE DAILY
Refills: 0 | Status: ACTIVE | COMMUNITY
Start: 2023-01-01

## 2023-01-01 RX ORDER — CHLORHEXIDINE GLUCONATE 213 G/1000ML
1 SOLUTION TOPICAL
Refills: 0 | Status: DISCONTINUED | OUTPATIENT
Start: 2023-01-01 | End: 2023-01-01

## 2023-01-01 RX ORDER — MORPHINE SULFATE 50 MG/1
2 CAPSULE, EXTENDED RELEASE ORAL EVERY 6 HOURS
Refills: 0 | Status: DISCONTINUED | OUTPATIENT
Start: 2023-01-01 | End: 2023-01-01

## 2023-01-01 RX ORDER — CHLORHEXIDINE GLUCONATE 213 G/1000ML
1 SOLUTION TOPICAL DAILY
Refills: 0 | Status: DISCONTINUED | OUTPATIENT
Start: 2023-01-01 | End: 2023-01-01

## 2023-01-01 RX ORDER — ASPIRIN/CALCIUM CARB/MAGNESIUM 324 MG
162 TABLET ORAL ONCE
Refills: 0 | Status: COMPLETED | OUTPATIENT
Start: 2023-01-01 | End: 2023-01-01

## 2023-01-01 RX ORDER — CARVEDILOL PHOSPHATE 80 MG/1
12.5 CAPSULE, EXTENDED RELEASE ORAL EVERY 12 HOURS
Refills: 0 | Status: DISCONTINUED | OUTPATIENT
Start: 2023-01-01 | End: 2023-01-01

## 2023-01-01 RX ORDER — SALIVA SUBSTITUTE COMB NO.11 351 MG
5 POWDER IN PACKET (EA) MUCOUS MEMBRANE ONCE
Refills: 0 | Status: COMPLETED | OUTPATIENT
Start: 2023-01-01 | End: 2023-01-01

## 2023-01-01 RX ORDER — MORPHINE SULFATE 50 MG/1
1 CAPSULE, EXTENDED RELEASE ORAL ONCE
Refills: 0 | Status: DISCONTINUED | OUTPATIENT
Start: 2023-01-01 | End: 2023-01-01

## 2023-01-01 RX ORDER — ONDANSETRON 8 MG/1
4 TABLET, FILM COATED ORAL EVERY 8 HOURS
Refills: 0 | Status: DISCONTINUED | OUTPATIENT
Start: 2023-01-01 | End: 2023-01-01

## 2023-01-01 RX ORDER — MORPHINE SULFATE 50 MG/1
2 CAPSULE, EXTENDED RELEASE ORAL ONCE
Refills: 0 | Status: DISCONTINUED | OUTPATIENT
Start: 2023-01-01 | End: 2023-01-01

## 2023-01-01 RX ORDER — POTASSIUM CHLORIDE 20 MEQ
10 PACKET (EA) ORAL
Refills: 0 | Status: DISCONTINUED | OUTPATIENT
Start: 2023-01-01 | End: 2023-01-01

## 2023-01-01 RX ORDER — BUMETANIDE 0.25 MG/ML
1 INJECTION INTRAMUSCULAR; INTRAVENOUS ONCE
Refills: 0 | Status: DISCONTINUED | OUTPATIENT
Start: 2023-01-01 | End: 2023-01-01

## 2023-01-01 RX ORDER — CARVEDILOL PHOSPHATE 80 MG/1
1 CAPSULE, EXTENDED RELEASE ORAL
Refills: 0 | DISCHARGE

## 2023-01-01 RX ORDER — CARVEDILOL PHOSPHATE 80 MG/1
0 CAPSULE, EXTENDED RELEASE ORAL
Qty: 0 | Refills: 0 | DISCHARGE

## 2023-01-01 RX ADMIN — Medication 0.5 MILLIGRAM(S): at 07:43

## 2023-01-01 RX ADMIN — Medication 40 MILLIGRAM(S): at 05:50

## 2023-01-01 RX ADMIN — CARVEDILOL PHOSPHATE 12.5 MILLIGRAM(S): 80 CAPSULE, EXTENDED RELEASE ORAL at 05:50

## 2023-01-01 RX ADMIN — MORPHINE SULFATE 2 MILLIGRAM(S): 50 CAPSULE, EXTENDED RELEASE ORAL at 06:43

## 2023-01-01 RX ADMIN — Medication 40 MILLIGRAM(S): at 12:39

## 2023-01-01 RX ADMIN — Medication 5 MILLILITER(S): at 02:29

## 2023-01-01 RX ADMIN — Medication 0.5 MILLIGRAM(S): at 03:51

## 2023-01-01 RX ADMIN — MORPHINE SULFATE 2 MILLIGRAM(S): 50 CAPSULE, EXTENDED RELEASE ORAL at 22:28

## 2023-01-01 RX ADMIN — MORPHINE SULFATE 2 MILLIGRAM(S): 50 CAPSULE, EXTENDED RELEASE ORAL at 10:31

## 2023-01-01 RX ADMIN — Medication 162 MILLIGRAM(S): at 02:21

## 2023-01-01 RX ADMIN — MORPHINE SULFATE 1 MILLIGRAM(S): 50 CAPSULE, EXTENDED RELEASE ORAL at 08:15

## 2023-01-01 RX ADMIN — Medication 40 MILLIGRAM(S): at 05:54

## 2023-01-01 RX ADMIN — MORPHINE SULFATE 2 MILLIGRAM(S): 50 CAPSULE, EXTENDED RELEASE ORAL at 18:28

## 2023-01-01 RX ADMIN — MORPHINE SULFATE 2 MILLIGRAM(S): 50 CAPSULE, EXTENDED RELEASE ORAL at 11:18

## 2023-01-01 RX ADMIN — MORPHINE SULFATE 2 MILLIGRAM(S): 50 CAPSULE, EXTENDED RELEASE ORAL at 18:59

## 2023-01-01 RX ADMIN — MORPHINE SULFATE 2 MILLIGRAM(S): 50 CAPSULE, EXTENDED RELEASE ORAL at 14:46

## 2023-01-01 RX ADMIN — MORPHINE SULFATE 2 MILLIGRAM(S): 50 CAPSULE, EXTENDED RELEASE ORAL at 11:14

## 2023-01-01 RX ADMIN — CHLORHEXIDINE GLUCONATE 1 APPLICATION(S): 213 SOLUTION TOPICAL at 11:57

## 2023-01-01 RX ADMIN — Medication 40 MILLIGRAM(S): at 05:48

## 2023-01-01 RX ADMIN — MORPHINE SULFATE 2 MILLIGRAM(S): 50 CAPSULE, EXTENDED RELEASE ORAL at 07:56

## 2023-01-01 RX ADMIN — CEFTRIAXONE 100 MILLIGRAM(S): 500 INJECTION, POWDER, FOR SOLUTION INTRAMUSCULAR; INTRAVENOUS at 02:15

## 2023-01-01 RX ADMIN — CHLORHEXIDINE GLUCONATE 1 APPLICATION(S): 213 SOLUTION TOPICAL at 05:52

## 2023-01-01 RX ADMIN — Medication 40 MILLIEQUIVALENT(S): at 08:45

## 2023-01-01 RX ADMIN — Medication 40 MILLIGRAM(S): at 07:53

## 2023-01-01 RX ADMIN — Medication 81 MILLIGRAM(S): at 12:58

## 2023-01-01 RX ADMIN — Medication 5 MILLIGRAM(S): at 05:51

## 2023-01-01 RX ADMIN — Medication 20 MILLIEQUIVALENT(S): at 11:57

## 2023-01-01 RX ADMIN — CARVEDILOL PHOSPHATE 12.5 MILLIGRAM(S): 80 CAPSULE, EXTENDED RELEASE ORAL at 08:56

## 2023-01-01 RX ADMIN — Medication 81 MILLIGRAM(S): at 12:19

## 2023-01-01 RX ADMIN — Medication 81 MILLIGRAM(S): at 11:57

## 2023-01-01 RX ADMIN — AZITHROMYCIN 255 MILLIGRAM(S): 500 TABLET, FILM COATED ORAL at 02:48

## 2023-01-01 RX ADMIN — Medication 40 MILLIGRAM(S): at 14:38

## 2023-01-01 RX ADMIN — MORPHINE SULFATE 2 MILLIGRAM(S): 50 CAPSULE, EXTENDED RELEASE ORAL at 06:53

## 2023-01-01 RX ADMIN — CHLORHEXIDINE GLUCONATE 1 APPLICATION(S): 213 SOLUTION TOPICAL at 05:53

## 2023-01-01 RX ADMIN — Medication 40 MILLIGRAM(S): at 18:29

## 2023-01-01 RX ADMIN — MORPHINE SULFATE 2 MILLIGRAM(S): 50 CAPSULE, EXTENDED RELEASE ORAL at 07:59

## 2023-01-01 RX ADMIN — MORPHINE SULFATE 2 MILLIGRAM(S): 50 CAPSULE, EXTENDED RELEASE ORAL at 09:46

## 2023-01-01 RX ADMIN — MORPHINE SULFATE 2 MILLIGRAM(S): 50 CAPSULE, EXTENDED RELEASE ORAL at 13:40

## 2023-01-01 RX ADMIN — CARVEDILOL PHOSPHATE 12.5 MILLIGRAM(S): 80 CAPSULE, EXTENDED RELEASE ORAL at 08:32

## 2023-01-01 RX ADMIN — ENOXAPARIN SODIUM 40 MILLIGRAM(S): 100 INJECTION SUBCUTANEOUS at 05:49

## 2023-01-01 RX ADMIN — Medication 5 MILLIGRAM(S): at 08:32

## 2023-01-01 RX ADMIN — CARVEDILOL PHOSPHATE 12.5 MILLIGRAM(S): 80 CAPSULE, EXTENDED RELEASE ORAL at 05:51

## 2023-01-01 RX ADMIN — MORPHINE SULFATE 2 MILLIGRAM(S): 50 CAPSULE, EXTENDED RELEASE ORAL at 08:35

## 2023-01-01 RX ADMIN — Medication 81 MILLIGRAM(S): at 12:39

## 2023-01-01 RX ADMIN — MORPHINE SULFATE 2 MILLIGRAM(S): 50 CAPSULE, EXTENDED RELEASE ORAL at 22:58

## 2023-01-01 RX ADMIN — MORPHINE SULFATE 2 MILLIGRAM(S): 50 CAPSULE, EXTENDED RELEASE ORAL at 05:59

## 2023-01-01 RX ADMIN — MORPHINE SULFATE 2 MILLIGRAM(S): 50 CAPSULE, EXTENDED RELEASE ORAL at 00:14

## 2023-01-01 RX ADMIN — Medication 0.5 MILLIGRAM(S): at 00:17

## 2023-01-01 RX ADMIN — MORPHINE SULFATE 2 MILLIGRAM(S): 50 CAPSULE, EXTENDED RELEASE ORAL at 17:05

## 2023-01-01 RX ADMIN — Medication 40 MILLIGRAM(S): at 23:00

## 2023-01-01 RX ADMIN — MORPHINE SULFATE 1 MILLIGRAM(S): 50 CAPSULE, EXTENDED RELEASE ORAL at 07:14

## 2023-01-01 RX ADMIN — CARVEDILOL PHOSPHATE 12.5 MILLIGRAM(S): 80 CAPSULE, EXTENDED RELEASE ORAL at 17:30

## 2023-01-01 RX ADMIN — Medication 40 MILLIGRAM(S): at 02:22

## 2023-10-31 NOTE — CONSULT NOTE ADULT - ATTENDING COMMENTS
Acute hypoxemic respiratory failure due to acute decompensated heart failure requiring BiPAP in the ED. Blood gas without hypercapnia. Significantly improved with diuresis. Now taken off BiPAP and placed on NC@2LPM. Goal SpO2>90%. Continue diuresis as tolerated with strict I/O's and close monitoring of kidney function and lytes. Will sign off, please call back with questions.

## 2023-10-31 NOTE — ED PROVIDER NOTE - PROGRESS NOTE DETAILS
Lm Foster- cardiology consulted. Kanami Cristian- likely chf exacerbation. ct no PE. shows bl pleural effusions w/ groundglass opacities c/f pulm edema vs. infectious. started and tolerating bipap well. spoke to micu for first time bipap, will have pulm see her once admitted. MOLST form filled out with step son, pt DNR/DNI (has paperwork indicating and confirmed with penelope who is decision maker).

## 2023-10-31 NOTE — H&P ADULT - NSHPADDITIONALINFOADULT_GEN_ALL_CORE
Fluid: npo for now. when po, restrict to 1.5L/day or comfort feed  Electrolytes: replete potassium, phosphorus and magnesium to 4/3/2 respectively  Nutrition: npo for now. when po, DASH w/ 1.5L fluid resctriction/day. OR comfort feed per GOC   Activity: as tolerated     CODE STATUS: DNR/DNI, MOLST in chart   Preferred contact:  Michael (son) [phone]    Med rec:  Done with patient verbally.

## 2023-10-31 NOTE — CONSULT NOTE ADULT - SUBJECTIVE AND OBJECTIVE BOX
Patient seen and evaluated at bedside    Chief Complaint: SOB    HPI: 89 y/o female with unclear PMH, HTN, bradycardia s/p PPM, baseline confusion, presenting with SOB. Patient's son is law is able to provide a brief history but not her complete medical hx. Patient herself is confused and is unsure why she is in the hospital at all.    Family states she's had ~1 week of worsening SOB at home. She does not use O2 at home, but they think her breathing seems less comfortable than normal. She otherwise did not appears to have CP or any other significant symptoms. The SOB limited her mobility and progressively worsened so they brought her in.    Notably she has documentation with her that she is DNR/DNI and she personally states she would prefer her medical therapy to be directed more towards comfort but unclear how much she is able to understand in conversation. Her proxy is her son at bedside, but he deferred decision-making to her at this time.    Her listed medications at home include ASA, Coreg, and and ARB.    Initial labs remarkably for trop 1418, pro-BNP 80734, lactate 2.4.  Her EKG is a-sensed v-paced without ischemic changes meeting STEMI criteria.  CXR shows b/l hazy opacities, likely pulmonary edema and small-mod left pleural effusion.  No other information is available in the system.      Allergies:  No Known Allergies    Home Meds:    Current Medications:   azithromycin  IVPB 500 milliGRAM(s) IV Intermittent once    REVIEW OF SYSTEMS:  All other review of systems is negative unless indicated above.    Physical Exam:  T(F): 97.5 (10-31), Max: 97.5 (10-31)  HR: 86 (10-31) (86 - 97)  BP: 116/88 (10-31) (116/88 - 131/78)  RR: 30 (10-31)  SpO2: 100% (10-31)  GENERAL: No acute distress, elderly, somewhat tachypneic, but otherwise comfortable  CHEST/LUNG: Clear to auscultation bilaterally; No wheeze, equal breath sounds bilaterally   HEART: Regular rate and rhythm; No murmurs, rubs, or gallops  ABDOMEN: Soft, Nontender, Nondistended;  EXTREMITIES: Mild b/l LE edema  NEUROLOGY: AAOx1-2, non-focal    CXR: Personally reviewed    Labs: Personally reviewed                        11.8   10.55 )-----------( 287      ( 31 Oct 2023 00:42 )             36.5     10-31    135  |  100  |  39<H>  ----------------------------<  155<H>  4.5   |  21<L>  |  0.97    Ca    9.6      31 Oct 2023 00:42    TPro  7.6  /  Alb  3.8  /  TBili  1.1  /  DBili  x   /  AST  18  /  ALT  32  /  AlkPhos  132<H>  10-31    PT/INR - ( 31 Oct 2023 00:42 )   PT: 12.0 sec;   INR: 1.15 ratio         PTT - ( 31 Oct 2023 00:42 )  PTT:26.6 sec    CARDIAC MARKERS ( 31 Oct 2023 00:42 )  1418 ng/L / x     / x     / x     / x     / x                     Patient seen and evaluated at bedside    Chief Complaint: SOB    HPI: 89 y/o female with unclear PMH, HTN, bradycardia s/p PPM, baseline confusion, presenting with SOB. Patient's son is law is able to provide a brief history but not her complete medical hx. Patient herself is confused and is unsure why she is in the hospital at all.    Family states she's had ~1 week of worsening SOB at home. She does not use O2 at home, but they think her breathing seems less comfortable than normal. She otherwise did not appears to have CP or any other significant symptoms. The SOB limited her mobility and progressively worsened so they brought her in.    Notably she has documentation with her that she is DNR/DNI and she personally states she would prefer her medical therapy to be directed more towards comfort but unclear how much she is able to understand in conversation. Her proxy is her son at bedside, but he deferred decision-making to her at this time.    Her listed medications at home include ASA, Coreg, and and ARB.    Initial labs remarkably for trop 1418, pro-BNP 16948, lactate 2.4.  Her EKG is a-sensed v-paced without ischemic changes meeting STEMI criteria.  CXR shows b/l hazy opacities, likely pulmonary edema and small-mod left pleural effusion.  No other information is available in the system.      Allergies:  No Known Allergies    Home Medications:  aspirin 81 mg oral tablet: 1 tab(s) orally once a day (31 Oct 2023 06:19)  CARVEDILOL 12.5MG TAB:  (31 Oct 2023 06:19)  ENALAPRIL 5MG TAB:  (31 Oct 2023 06:19)    Current Medications:   azithromycin  IVPB 500 milliGRAM(s) IV Intermittent once    REVIEW OF SYSTEMS:  All other review of systems is negative unless indicated above.    Physical Exam:  T(F): 97.5 (10-31), Max: 97.5 (10-31)  HR: 86 (10-31) (86 - 97)  BP: 116/88 (10-31) (116/88 - 131/78)  RR: 30 (10-31)  SpO2: 100% (10-31)  GENERAL: No acute distress, elderly, somewhat tachypneic, but otherwise comfortable  CHEST/LUNG: Clear to auscultation bilaterally; No wheeze, equal breath sounds bilaterally   HEART: Regular rate and rhythm; No murmurs, rubs, or gallops  ABDOMEN: Soft, Nontender, Nondistended;  EXTREMITIES: Mild b/l LE edema  NEUROLOGY: AAOx1-2, non-focal    CXR: Personally reviewed    Labs: Personally reviewed                        11.8   10.55 )-----------( 287      ( 31 Oct 2023 00:42 )             36.5     10-31    135  |  100  |  39<H>  ----------------------------<  155<H>  4.5   |  21<L>  |  0.97    Ca    9.6      31 Oct 2023 00:42    TPro  7.6  /  Alb  3.8  /  TBili  1.1  /  DBili  x   /  AST  18  /  ALT  32  /  AlkPhos  132<H>  10-31    PT/INR - ( 31 Oct 2023 00:42 )   PT: 12.0 sec;   INR: 1.15 ratio         PTT - ( 31 Oct 2023 00:42 )  PTT:26.6 sec    CARDIAC MARKERS ( 31 Oct 2023 00:42 )  1418 ng/L / x     / x     / x     / x     / x

## 2023-10-31 NOTE — CONSULT NOTE ADULT - SUBJECTIVE AND OBJECTIVE BOX
PULMONARY CONSULTATION NOTE    NAME: BECKI OLIVEIRA  MEDICAL RECORD NUMBER: [id number]    CHIEF COMPLAINT: Patient is a 89y old  Female who presents with a chief complaint of     HISTORY OF PRESENT ILLNESS:       ====================BACKGROUND INFORMATION====================    FAMILY HISTORY: FAMILY HISTORY:      PAST MEDICAL AND SURGICAL HISTORY: PAST MEDICAL & SURGICAL HISTORY:  HTN (hypertension)      HLD (hyperlipidemia)      Pacemaker          ALLERGIES:Allergies    No Known Allergies    Intolerances        HOME MEDICATIONS:     OUTPATIENT PULMONARY DOCTOR:     PFTs:    SOCIAL HISTORY:  TOBACCO USE:  ALCOHOL:  DRUGS:  MARITAL STATUS:  EMPLOYMENT:  EXPOSURES:  RECENT TRAVELS:  PETS:  CODE STATUS:    ====================REVIEW OF SYSTEMS======================  CONSTITUTIONAL:  CARDIOVASCULAR:  PULMONARY:  GASTROINTESTINAL:  [] ALL OTHER REVIEW OF SYSTEMS ARE NEGATIVE   [] UNABLE TO OBTAIN REVIEW OF SYSTEMS DUE TO ______________      ====================PHYSICAL EXAM==========================    VITALS: ICU Vital Signs Last 24 Hrs  T(C): 36.6 (31 Oct 2023 04:31), Max: 36.6 (31 Oct 2023 04:31)  T(F): 97.8 (31 Oct 2023 04:31), Max: 97.8 (31 Oct 2023 04:31)  HR: 86 (31 Oct 2023 07:10) (79 - 97)  BP: 109/83 (31 Oct 2023 07:10) (109/83 - 131/78)  BP(mean): --  ABP: --  ABP(mean): --  RR: 19 (31 Oct 2023 07:10) (18 - 19)  SpO2: 99% (31 Oct 2023 07:10) (91% - 100%)    O2 Parameters below as of 31 Oct 2023 07:10  Patient On (Oxygen Delivery Method): BiPAP/CPAP            INTAKE and OUTPUT: I&O's Summary      WEIGHT: Daily Height in cm: 147.32 (30 Oct 2023 20:25)    Daily     GLUCOSE: CAPILLARY BLOOD GLUCOSE          VENTILATOR SETTINGS:     Physical Exam  CONST:     NAD, well-appearing; well-developed; appears stated age  EYES:         Conjunctiva clear; PERRL; no conjunctival pallor; no lid lag  ENMT:       MMM, no pharyngeal injection; oropharynx not crowded;normal dentition/dentures present  NECK:        Supple, no LAD; no palpable masses; no thyromegaly  RESP :        Normal respiratory effort; CTA bilaterally; no W/R/R  CHEST:       No TTP, no lines/ports; symmetric chest expansion  CARDIO:     RRR, normal S1 and S2, no M/R/G  VASC:         No JVD/AJR, no peripheral edema, pulses 2+ B/L, Cap refill <2s  ABD:           Soft, NT/ND, norm bowel sounds  MSK:          No clubbing of digits; no joint swelling or TTP  EXT:           WWP, no reduction in body hair, no LE skin changes  PSYCH        A&O to person, place, and time; affect appropriate  NEURO:     Non-focal; no gross sensory deficits; moving all extremities   SKIN:          No rashes; no palpable lesions; axillae not dry      ====================MEDICATIONS==============================  MEDICATIONS  (STANDING):  aspirin enteric coated 81 milliGRAM(s) Oral daily  carvedilol 12.5 milliGRAM(s) Oral every 12 hours  enalapril 5 milliGRAM(s) Oral daily      MEDICATIONS  (PRN):  acetaminophen     Tablet .. 650 milliGRAM(s) Oral every 6 hours PRN Temp greater or equal to 38C (100.4F), Mild Pain (1 - 3)  aluminum hydroxide/magnesium hydroxide/simethicone Suspension 30 milliLiter(s) Oral every 4 hours PRN Dyspepsia  melatonin 3 milliGRAM(s) Oral at bedtime PRN Insomnia  ondansetron Injectable 4 milliGRAM(s) IV Push every 8 hours PRN Nausea and/or Vomiting      ====================LABORATORY RESULTS===================  CBC Full  -  ( 31 Oct 2023 00:42 )  WBC Count : 10.55 K/uL  RBC Count : 3.93 M/uL  Hemoglobin : 11.8 g/dL  Hematocrit : 36.5 %  Platelet Count - Automated : 287 K/uL  Mean Cell Volume : 92.9 fl  Mean Cell Hemoglobin : 30.0 pg  Mean Cell Hemoglobin Concentration : 32.3 gm/dL  Auto Neutrophil # : 8.74 K/uL  Auto Lymphocyte # : 0.96 K/uL  Auto Monocyte # : 0.69 K/uL  Auto Eosinophil # : 0.04 K/uL  Auto Basophil # : 0.03 K/uL  Auto Neutrophil % : 82.8 %  Auto Lymphocyte % : 9.1 %  Auto Monocyte % : 6.5 %  Auto Eosinophil % : 0.4 %  Auto Basophil % : 0.3 %                                    10-31    135  |  100  |  39<H>  ----------------------------<  155<H>  4.5   |  21<L>  |  0.97    Ca    9.6      31 Oct 2023 00:42    TPro  7.6  /  Alb  3.8  /  TBili  1.1  /  DBili  x   /  AST  18  /  ALT  32  /  AlkPhos  132<H>  10-31        PT/INR - ( 31 Oct 2023 00:42 )   PT: 12.0 sec;   INR: 1.15 ratio         PTT - ( 31 Oct 2023 00:42 )  PTT:26.6 sec    Creatinine Trend: 0.97<--      =============RADIOLOGY and ECHOCARDIOGRAPHY==================    CXR:      CT CHEST:      ECHOCARDIOGRAPHY:      POINT OF CARE ULTRASOUND: PULMONARY CONSULTATION NOTE    NAME: BECKI OLIVEIRA  MEDICAL RECORD NUMBER: [id number]    CHIEF COMPLAINT: Patient is a 89y old  Female who presents with a chief complaint of     HISTORY OF PRESENT ILLNESS:   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   Presenting w/ progressively worsening SOB that is limiting her mobility x1week.   Hx limited d/t pt's dementia and pt's son not knowing pt's hx fully. Pt has not seen physician in few yrs.     On arrival, afebrile, /78, pulse wnl, and 91% on RA -> 100% on 2LNC -> bipap put on d/t increased wob   CBC w/ wbc 10.5, hgb 11.8, and plt 287, coag wnl.   CMP nonactionable. Trop 1418 -> 1179, proBNP 73072, CKMB 6.4  VBG w/ pH 7.43, lactic 2.4, pCO2 36, PO2 31, and HCO3 24.   COVID, flu a/b, and rsv neg.   CTA chest w/o PE but showed b/l mod PLEF, nonspecific predominately upper GGO and  RUL consolidation, LLL compressive atelectasis vs. consolidation. Prominent prevascular lymph nodes measuring up to 1 cm, which may be reactive.    Seen by cards in the ED, EKG a/v-sensed and w/o ischemic changes meeting STEMI criteria. Rec TTE, monitor UOP s/p lasix in the ED, trend trop/ckmb/cpk/lactic.     of note, pt with documentation of DNR/DNI MOLST form, possibly leaning towards comfort care.  Pt is AAOx2-3 (correct to self and time; location was [place]" but couldn't seem to figure out she's at hospital). was able to recall three of her medications. Currently agreeable to keeping the bipap on, blood work and echocardiogram.   Currently reports some SOB (slightly better with bipap but not too much), and denies ha, dizziness, cp, abd pain, n/v/d, or urinary sx.    (31 Oct 2023 05:19)      ====================BACKGROUND INFORMATION====================    FAMILY HISTORY: FAMILY HISTORY:      PAST MEDICAL AND SURGICAL HISTORY: PAST MEDICAL & SURGICAL HISTORY:  HTN (hypertension)      HLD (hyperlipidemia)      Pacemaker          ALLERGIES:Allergies    No Known Allergies    Intolerances        HOME MEDICATIONS:     OUTPATIENT PULMONARY DOCTOR:     PFTs:    SOCIAL HISTORY:  TOBACCO USE:  ALCOHOL:  DRUGS:  MARITAL STATUS:  EMPLOYMENT:  EXPOSURES:  RECENT TRAVELS:  PETS:  CODE STATUS:    ====================REVIEW OF SYSTEMS======================  CONSTITUTIONAL:  CARDIOVASCULAR:  PULMONARY:  GASTROINTESTINAL:  [] ALL OTHER REVIEW OF SYSTEMS ARE NEGATIVE   [] UNABLE TO OBTAIN REVIEW OF SYSTEMS DUE TO ______________      ====================PHYSICAL EXAM==========================    VITALS: ICU Vital Signs Last 24 Hrs  T(C): 36.6 (31 Oct 2023 04:31), Max: 36.6 (31 Oct 2023 04:31)  T(F): 97.8 (31 Oct 2023 04:31), Max: 97.8 (31 Oct 2023 04:31)  HR: 86 (31 Oct 2023 07:10) (79 - 97)  BP: 109/83 (31 Oct 2023 07:10) (109/83 - 131/78)  BP(mean): --  ABP: --  ABP(mean): --  RR: 19 (31 Oct 2023 07:10) (18 - 19)  SpO2: 99% (31 Oct 2023 07:10) (91% - 100%)    O2 Parameters below as of 31 Oct 2023 07:10  Patient On (Oxygen Delivery Method): BiPAP/CPAP            INTAKE and OUTPUT: I&O's Summary      WEIGHT: Daily Height in cm: 147.32 (30 Oct 2023 20:25)    Daily     GLUCOSE: CAPILLARY BLOOD GLUCOSE          VENTILATOR SETTINGS:     Physical Exam  CONST:     NAD, well-appearing; well-developed; appears stated age  EYES:         Conjunctiva clear; PERRL; no conjunctival pallor; no lid lag  NECK:        Supple, no LAD; no palpable masses; no thyromegaly  RESP :        Normal respiratory effort; crackles b/l  CHEST:       No TTP, no lines/ports; symmetric chest expansion  CARDIO:     RRR, normal S1 and S2, no M/R/G  VASC:         + JVD/AJR, 1+ peripheral edema, pulses 2+ B/L, Cap refill <2s  ABD:           Soft, NT/ND, norm bowel sounds  MSK:          No clubbing of digits; no joint swelling or TTP  NEURO:     Non-focal; no gross sensory deficits; moving all extremities   SKIN:          No rashes; no palpable lesions; axillae not dry      ====================MEDICATIONS==============================  MEDICATIONS  (STANDING):  aspirin enteric coated 81 milliGRAM(s) Oral daily  carvedilol 12.5 milliGRAM(s) Oral every 12 hours  enalapril 5 milliGRAM(s) Oral daily      MEDICATIONS  (PRN):  acetaminophen     Tablet .. 650 milliGRAM(s) Oral every 6 hours PRN Temp greater or equal to 38C (100.4F), Mild Pain (1 - 3)  aluminum hydroxide/magnesium hydroxide/simethicone Suspension 30 milliLiter(s) Oral every 4 hours PRN Dyspepsia  melatonin 3 milliGRAM(s) Oral at bedtime PRN Insomnia  ondansetron Injectable 4 milliGRAM(s) IV Push every 8 hours PRN Nausea and/or Vomiting      ====================LABORATORY RESULTS===================  CBC Full  -  ( 31 Oct 2023 00:42 )  WBC Count : 10.55 K/uL  RBC Count : 3.93 M/uL  Hemoglobin : 11.8 g/dL  Hematocrit : 36.5 %  Platelet Count - Automated : 287 K/uL  Mean Cell Volume : 92.9 fl  Mean Cell Hemoglobin : 30.0 pg  Mean Cell Hemoglobin Concentration : 32.3 gm/dL  Auto Neutrophil # : 8.74 K/uL  Auto Lymphocyte # : 0.96 K/uL  Auto Monocyte # : 0.69 K/uL  Auto Eosinophil # : 0.04 K/uL  Auto Basophil # : 0.03 K/uL  Auto Neutrophil % : 82.8 %  Auto Lymphocyte % : 9.1 %  Auto Monocyte % : 6.5 %  Auto Eosinophil % : 0.4 %  Auto Basophil % : 0.3 %                                    10-31    135  |  100  |  39<H>  ----------------------------<  155<H>  4.5   |  21<L>  |  0.97    Ca    9.6      31 Oct 2023 00:42    TPro  7.6  /  Alb  3.8  /  TBili  1.1  /  DBili  x   /  AST  18  /  ALT  32  /  AlkPhos  132<H>  10-31        PT/INR - ( 31 Oct 2023 00:42 )   PT: 12.0 sec;   INR: 1.15 ratio         PTT - ( 31 Oct 2023 00:42 )  PTT:26.6 sec    Creatinine Trend: 0.97<--      =============RADIOLOGY and ECHOCARDIOGRAPHY==================    CXR:      CT CHEST:      ECHOCARDIOGRAPHY:      POINT OF CARE ULTRASOUND:

## 2023-10-31 NOTE — CONSULT NOTE ADULT - ASSESSMENT
96F  89F with unknown PMH but has PPM, dementia presenting for subacute dyspnea. Exam concerning for VOL, no wheezing. CT chest with intralobular septal thickening, diffuse GGO, and bilateral small pleural effusions. BNP elevated to 30k.    Favor CHF exacerbation, overall low concern for pneumonia.    Breathing stable on BiPAP, blood gas without hypercapnia.    Recommendations:  C/w diuresis as tolerated  Wean off Bipap    Pulmonary to sign off, please call back with questions

## 2023-10-31 NOTE — CONSULT NOTE ADULT - ATTENDING COMMENTS
Patient seen and examined. Agree with assessment and plan as outlined above.  89 y/o female with unclear PMH, HTN, bradycardia s/p PPM, baseline confusion, presenting with SOB. Patient reports initial dyspnea on exertion and then shortness of breath at rest over past week. Patient requiring bipap in the emergency room.  -Pulmonary edema seen on chest x-ray and CT chest.  -Start standing IV diuretics with furosemide 40 mg IV bid. Monitor uop, daily weights, creatinine with diuresis.  -Continue out-patient home BP meds for now with hold parameters. Patient on enalapril and coreg.  -Check TTE  -Will follow    Su Thorne MD  Cardiology Attending  [facility]    For day time coverage Mon-Fri see Non-Service Consult Attending on [email or web address], password: [email or web address]; daytime weekends covered by general cardiology consult service attending.)

## 2023-10-31 NOTE — ED PROVIDER NOTE - PHYSICAL EXAMINATION
General appearance: NAD, conversant, afebrile    Eyes: anicteric sclerae, AURY, EOMI   HENT: Atraumatic; oropharynx clear, MMM and no ulcerations, no pharyngeal erythema or exudate   Neck: Trachea midline; Full range of motion, supple   Pulm: diminished bl, normal respiratory effort and no intercostal retractions, normal work of breathing   CV: RRR, No murmurs, rubs, or gallops.    Abdomen: Soft, non-tender, non-distended; no guarding or rebound   Extremities: mild peripheral ble edema   Skin: Dry, normal temperature, turgor and texture; no rash, ulcers or subcutaneous nodules

## 2023-10-31 NOTE — H&P ADULT - NSHPPHYSICALEXAM_GEN_ALL_CORE
Vital Signs Last 24 Hrs  T(C): 36.6 (31 Oct 2023 04:31), Max: 36.6 (31 Oct 2023 04:31)  T(F): 97.8 (31 Oct 2023 04:31), Max: 97.8 (31 Oct 2023 04:31)  HR: 90 (31 Oct 2023 04:31) (79 - 97)  BP: 118/60 (31 Oct 2023 04:31) (116/88 - 131/78)  BP(mean): --  RR: 19 (31 Oct 2023 04:31) (18 - 19)  SpO2: 100% (31 Oct 2023 04:31) (91% - 100%)    Parameters below as of 31 Oct 2023 04:31  Patient On (Oxygen Delivery Method): BiPAP/CPAP        CONSTITUTIONAL: Well-groomed, in no apparent distress  EYES: No conjunctival or scleral injection, non-icteric  ENMT: No external nasal lesions; MMM  RESPIRATORY: on bipap, diminished BS w/  mild crackles at all lung fields, no wheezing.   CARDIOVASCULAR: +S1S2, RRR, no M/G/R; pedal pulses full and symmetric; no lower extremity edema  GASTROINTESTINAL: No tenderness, +BS throughout, no rebound/guarding  NEUROLOGIC: No gross focal neurological deficits, AAOX2-3  PSYCHIATRIC: mood and affect appropriate

## 2023-10-31 NOTE — PATIENT PROFILE ADULT - DIETITIAN.
dietitian/nutrition services
Hide Additional Notes?: No
Include Location In Plan?: Yes
Detail Level: Zone

## 2023-10-31 NOTE — ED ADULT NURSE REASSESSMENT NOTE - NS ED NURSE REASSESS COMMENT FT1
0700 Report received from JOLYNN Lin Pt AAOx4, NAD, resp nonlabored, skin warm/dry, resting comfortably in bed . Pt denies headache, dizziness, chest pain, palpitations, SOB, abd pain, n/v/d, urinary symptoms, fevers, chills, weakness at this time. Pt awaiting .for bed.  Safety maintained with call bell within reach., pt on  bipap, no distress note

## 2023-10-31 NOTE — PATIENT PROFILE ADULT - FALL HARM RISK - HARM RISK INTERVENTIONS

## 2023-10-31 NOTE — ED ADULT NURSE NOTE - OBJECTIVE STATEMENT
89 y/o female PMH HTN presents to ED from home with son at bedside c/o SOB, worsening over last few days and throat pain. Denies cough, fevers, chills, n/v/d, chest pain. Pt is A&O x 3. Arrived to ED hypoxic to high 80s, placed on 2L with improvement to 99%. Breath sounds diminished b/l. Gross motor and neuro intact. Uses walker to ambulate. 20G IV placed in RAC. Paced rhythm on CM. Safety and comfort provided. Family at bedside.

## 2023-10-31 NOTE — ED ADULT NURSE NOTE - NSFALLHARMRISKINTERV_ED_ALL_ED

## 2023-10-31 NOTE — H&P ADULT - PROBLEM SELECTOR PLAN 1
-s/p lasix 40ivp x1, went to bathroom once since.   -put on purwix for accurate i/o   -c/w bipap for now.   -if unable to wean off bipap by late morning, administer additional lasix   -goal net neg 1-2L /24hr  -also, if unable to wean off bipap by afternoon, consider further GOC and possibly discontinuing bipap for comfort as patient did not want to stay on bipap for a long time.  -f/u tte  -trend labs per cards while consistent w/ goc

## 2023-10-31 NOTE — ED PROVIDER NOTE - OBJECTIVE STATEMENT
89-year-old female with history of hypertension, pacemaker presenting with shortness of breath.  Patient started having shortness of breath 1 week ago, progressive worsening.  Patient unreliable, stepson at bedside. no known stents.  No chest pain, abdominal pain, nausea, vomiting, bloody or dark stools. Has not seen her pcp or cardiologist in 3yrs.

## 2023-10-31 NOTE — ED PROVIDER NOTE - ATTENDING CONTRIBUTION TO CARE
89 F w/ hx of HTN accompanied by her step son ariadna here w/ dyspnea, pt states sx worsening over past few days, no associated cough, fevers, chills, pt doesn't know color of stools, she has no cp, no back pain, no abd pain, eating and drinking normally, no new leg swelling  On exam, pt is awake and alert in minimal distress, speaking in full sentnances on 2L NC, she has diminished breath sounds b/l she has soft abd 2+ radial and DP pulse, pt w/ b/l lower leg edema. Pt w/ dyspnea, possible anemia, vs electrolyte vs pna, vs pnx, vs less likely vte, plan for labs imaging and reassessment to be admitted given hypoxia.

## 2023-10-31 NOTE — ED ADULT NURSE REASSESSMENT NOTE - NS ED NURSE REASSESS COMMENT FT1
admitting team ( [facility]) switched from Bippap to NC 2 lts , pt awake, alert no distress noted, will keep monitor

## 2023-10-31 NOTE — CONSULT NOTE ADULT - ASSESSMENT
89 y/o female with unclear PMH, HTN, bradycardia s/p PPM, baseline confusion, presenting with SOB. Patient's son is law is able to provide a brief history but not her complete medical hx. Patient herself is confused and is unsure why she is in the hospital at all.    Family states she's had ~1 week of worsening SOB at home. She does not use O2 at home, but they think her breathing seems less comfortable than normal. She otherwise did not appears to have CP or any other significant symptoms. The SOB limited her mobility and progressively worsened so they brought her in.    Notably she has documentation with her that she is DNR/DNI and she personally states she would prefer her medical therapy to be directed more towards comfort but unclear how much she is able to understand in conversation. Her proxy is her son at bedside, but he deferred decision-making to her at this time.    Her listed medications at home include ASA, Coreg, and and ARB.    Initial labs remarkably for trop 1418, pro-BNP 67765, lactate 2.4.  Her EKG is a-sensed v-paced without ischemic changes meeting STEMI criteria.  CXR shows b/l hazy opacities, likely pulmonary edema and small-mod left pleural effusion.  No other information is available in the system.    Impression/Recommendations:  - Presentation, labs, CXR consistent with CHF exacerbation - no known hx of HF, but her medication list is consistent with possible hx HFrEF.  - Need additional collateral regarding cardiac hx  - Need further discussion regarding GOC - patient/family might be leaning more towards comfort measures?  - For now trend troponin, CKMB, CPK, lactate, EKGs  - Would not treat ACS at this time - enzyme elevation more likely iso CHF exacerbation  - S/p Lasix 40mg IV in ED, monitor UOP, strict I/Os,  - TTE in am  - Can continue home meds    Note incomplete until cosigned by attending.    Cristhian Rodriguez, PGY-4  Cardiology Fellow    For all new consults  [email or web address]  Login: jama

## 2023-10-31 NOTE — H&P ADULT - HISTORY OF PRESENT ILLNESS
89F PMHx HTN, HLD, CAD? bradycardia s/p PPM, dementia.   Presenting w/ progressively worsening SOB that is limiting her mobility x1week.   Hx limited d/t pt's dementia and pt's son not knowing pt's hx fully. Pt has not seen physician in few yrs.     On arrival, afebrile, /78, pulse wnl, and 91% on RA -> 100% on 2LNC   CBC w/ wbc 10.5, hgb 11.8, and plt 287, coag wnl.   CMP nonactionable. Trop 1418 -> 1179, proBNP 74870, CKMB 6.4  VBG w/ pH 7.43, lactic 2.4, pCO2 36, PO2 31, and HCO3 24.   COVID, flu a/b, and rsv neg.   CTA chest w/o PE but showed b/l mod PLEF, nonspecific predominately upper GGO and  RUL consolidation, LLL compressive atelectasis vs. consolidation. Prominent prevascular lymph nodes measuring up to 1 cm, which may be reactive.    Seen by cards in the ED, EKG a/v-sensed and w/o ischemic changes meeting STEMI criteria. Rec TTE, monitor UOP s/p lasix in the ED, trend trop/ckmb/cpk/lactic.     of note, pt with documentation of DNR/DNI, possibly leaning towards comfort care.    89F PMHx HTN, HLD, CAD? bradycardia s/p PPM, dementia.   Presenting w/ progressively worsening SOB that is limiting her mobility x1week.   Hx limited d/t pt's dementia and pt's son not knowing pt's hx fully. Pt has not seen physician in few yrs.     On arrival, afebrile, /78, pulse wnl, and 91% on RA -> 100% on 2LNC -> bipap put on d/t increased wob   CBC w/ wbc 10.5, hgb 11.8, and plt 287, coag wnl.   CMP nonactionable. Trop 1418 -> 1179, proBNP 33930, CKMB 6.4  VBG w/ pH 7.43, lactic 2.4, pCO2 36, PO2 31, and HCO3 24.   COVID, flu a/b, and rsv neg.   CTA chest w/o PE but showed b/l mod PLEF, nonspecific predominately upper GGO and  RUL consolidation, LLL compressive atelectasis vs. consolidation. Prominent prevascular lymph nodes measuring up to 1 cm, which may be reactive.    Seen by cards in the ED, EKG a/v-sensed and w/o ischemic changes meeting STEMI criteria. Rec TTE, monitor UOP s/p lasix in the ED, trend trop/ckmb/cpk/lactic.     of note, pt with documentation of DNR/DNI MOLST form, possibly leaning towards comfort care.  Pt is AAOx2-3 (correct to self and time; location was [place]" but couldn't seem to figure out she's at hospital). was able to recall three of her medications. Currently agreeable to keeping the bipap on, blood work and echocardiogram.   Currently reports some SOB (slightly better with bipap but not too much), and denies ha, dizziness, cp, abd pain, n/v/d, or urinary sx.

## 2023-10-31 NOTE — ED PROVIDER NOTE - CLINICAL SUMMARY MEDICAL DECISION MAKING FREE TEXT BOX
89-year-old female with history of hypertension, pacemaker presenting with shortness of breath.  Patient started having shortness of breath 1 week ago, progressive worsening.  Patient unreliable, stepson at bedside. no known stents.  No chest pain, abdominal pain, nausea, vomiting, bloody or dark stools. No hx blood clot, recent travel, leg pain. Exam nonfocal. Low suspicion vte. c/f anemia vs. pna vs. electrolyte. Will get labs, cxr, reassess.

## 2023-11-01 NOTE — DIETITIAN INITIAL EVALUATION ADULT - REASON INDICATOR FOR ASSESSMENT
Consult received for CHF   Information obtained from pt, pt's stepson at bedside, electronic medical record  Chart reviewed, events noted

## 2023-11-01 NOTE — DIETITIAN INITIAL EVALUATION ADULT - CONTINUE CURRENT NUTRITION CARE PLAN
Consider formal swallowing evaluation with speech pathologist in setting of swallowing difficulty/gagging.   Defer diet/fluid consistencies to medical team/SLP recommendations.

## 2023-11-01 NOTE — PROVIDER CONTACT NOTE (OTHER) - REASON
Low BP. Furosemide due.
pt noted w/ increased work of breath + audible crackle sound
Potassium IVPB ordered. No IV access.

## 2023-11-01 NOTE — PROVIDER CONTACT NOTE (OTHER) - RECOMMENDATIONS
Notify provider. Push furosemide?
Notify provider. Switch Potassium IVPB to Klorcon powder?
notify provider

## 2023-11-01 NOTE — DIETITIAN INITIAL EVALUATION ADULT - PERSON TAUGHT/METHOD
Encouraged adequate consumption of meals/supplements to optimize protein-energy intake/stepson/verbal instruction/patient instructed

## 2023-11-01 NOTE — PROVIDER CONTACT NOTE (OTHER) - BACKGROUND
89yoF PMHx HTN, PPM p/w shortness of breath.  Patient started having shortness of breath 1 week ago, progressive worsening.  Patient unreliable, stepson at bedside.
Pt admitted for Heart failure.
Pt admitted for Heart failure.

## 2023-11-01 NOTE — PROGRESS NOTE ADULT - PROBLEM SELECTOR PLAN 1
-CT chest showing pleff, and came in with acute hypoxic respiratory failure 2/2 to HFrEF  - weaned off bipap, titrating oxygen now  - c/w lasix 40 IV BID for now, likely transition to orals. still with crackles b/l   - goal net neg 1-2L /24hr  - TTE 11/1 showing: LVEF 32%, segmental WMAs, no lv thrombus, reduced RVSF, mild, moderate MR, severe mitral valve stenosis, moderate pulmonary HTN   - c/w asa 81, coreg 12.5BID, enalapril 5 qD  - cardiology recs appreciated

## 2023-11-01 NOTE — DIETITIAN INITIAL EVALUATION ADULT - PERTINENT LABORATORY DATA
11-01    139  |  98  |  36<H>  ----------------------------<  123<H>  3.4<L>   |  22  |  1.00    Ca    9.4      01 Nov 2023 07:26  Mg     2.5     11-01    TPro  7.6  /  Alb  3.8  /  TBili  1.1  /  DBili  x   /  AST  18  /  ALT  32  /  AlkPhos  132<H>  10-31    11-01 @ 07:26: Na 139, BUN 36<H>, Cr 1.00, <H>, K+ 3.4<L>, Phos --, Mg 2.5, Alk Phos --, ALT/SGPT --, AST/SGOT --, HbA1c --

## 2023-11-01 NOTE — PHYSICAL THERAPY INITIAL EVALUATION ADULT - GENERAL OBSERVATIONS, REHAB EVAL
Pt rec'd semisupine in bed, in NAD, +IVL, +NC 4L, step son at bedside. Agreeable to PT. RN cleared pt to be seen.

## 2023-11-01 NOTE — PROVIDER CONTACT NOTE (OTHER) - ACTION/TREATMENT ORDERED:
Provider made aware. Push furosemide till 18:00.
Provider aware. Potassium IVPB discontinued. Klorcon powder 20 mEq x1 ordered.
provider saw patient bedside, IV lasix ordered BID, advised to continue monitor pt saturation and report any further events

## 2023-11-01 NOTE — DIETITIAN INITIAL EVALUATION ADULT - NSFNSNUTRCHEWSWALLOWFT_GEN_A_CORE
pt reports difficulty chewing/ swallowing   Consider formal swallowing evaluation with speech pathologist in setting of swallowing difficulty/gagging.   Defer diet/fluid consistencies to medical team/SLP recommendations.

## 2023-11-01 NOTE — PROVIDER CONTACT NOTE (OTHER) - ASSESSMENT
Vitals: 117/68 HR 99 Sao2 91% on 5L NC RR 22 TEMP 97.3. A&Ox 2-3, audible crackle sound noted b/l pt c/o discomfort breathing hard to breath pt not in any visible distress.

## 2023-11-01 NOTE — PHARMACOTHERAPY INTERVENTION NOTE - COMMENTS
Performed medication reconciliation and home medication list updated in prescription writer/ outpatient medication review. Medications verified with patient's son Chad and pharmacy.    Home medications:  Asprin 81 oral delayed release tablet: 1 tab(s) orally once a day   carvedilol 12.5 mg oral tablet: 1 tab(s) orally 2 times a day   enalapril 5 mg oral tablet: 1 tab(s) orally once a day       Josefina Lazar  PharmD Candidate 2024

## 2023-11-01 NOTE — DIETITIAN INITIAL EVALUATION ADULT - ORAL INTAKE PTA/DIET HISTORY
Pt step son state pt was eating "regular food" PTA but "not too hard foods" Reports not following specific diet/ diet restrictions PTA and confirmed no known food allergies/ food intolerances   reports pt is picky eater

## 2023-11-01 NOTE — PROVIDER CONTACT NOTE (OTHER) - ASSESSMENT
Pt is AAOx3-4. VSS on 4 L NC; denies CP, SOB, no acute events noted on tele. 40 mEq Klorcon powder x1 given already.

## 2023-11-01 NOTE — PHYSICAL THERAPY INITIAL EVALUATION ADULT - PERTINENT HX OF CURRENT PROBLEM, REHAB EVAL
89year old female w/ hx of Dementia, HTN, HLD, CAD? bradycardia s/p PPM who now presents with acute hypoxic respiratory failure requiring bi-level support which has been titrated off, admitted for AHRF 2/2 HFrEF.   CXR: Small to moderate left pleural effusion. Bilateral hazy opacities, which may represent pneumonia versus mild pulmonary edema.  CT Angio Chest: No pulmonary embolism. Moderate bilateral pleural effusions. Nonspecific bilateral upper lobe predominant groundglass opacities and right upper lobe consolidations may represent pulmonary edema, inflammatory or infectious etiology. Left lower lobe compressive atelectasis versus consolidation. Prominent prevascular lymph nodes measuring up to 1 cm, which may be reactive.

## 2023-11-01 NOTE — PHYSICAL THERAPY INITIAL EVALUATION ADULT - ADDITIONAL COMMENTS
Pt lives alone on first level of two-family house and step-son lives upstairs. Has 5 stairs to enter (+HR) and first floor setup. Pt and stepson reports pt is independent with functional mobility with rolling walker and reports pt has difficulty showering, so takes a spongebath. Owns RW, and transport wheelchair.

## 2023-11-01 NOTE — PROVIDER CONTACT NOTE (OTHER) - ASSESSMENT
Pt is AAOx4. VSS; denies CP, SOB, no acute events; Low BP. Pt is AAOx 3-4. VSS on 4 L NC, except  Low BP.; denies CP, SOB, no acute events on tele; Pt asymptomatic. Denies lightheadedness, dizziness.

## 2023-11-01 NOTE — PROVIDER CONTACT NOTE (OTHER) - SITUATION
Low BP. Furosemide due.
Potassium IVPB ordered. No IV access.
pt noted w/ increased work of breath + audible crackle sound

## 2023-11-01 NOTE — PHYSICAL THERAPY INITIAL EVALUATION ADULT - NSPTDISCHREC_GEN_A_CORE
TBD pending functional eval IF pt returns home, will require assist with ALL functional mobility and HomePT services. DME: shower chair. Pt owns rolling walker and transport chair./Sub-acute Rehab

## 2023-11-01 NOTE — DIETITIAN INITIAL EVALUATION ADULT - NSFNSGIIOFT_GEN_A_CORE
- Pt denies nausea, vomiting, diarrhea, or constipation at this time   - Last BM:?10/31 per pt, not sure; not currently ordered for bowel regimen

## 2023-11-01 NOTE — DIETITIAN INITIAL EVALUATION ADULT - PERTINENT MEDS FT
MEDICATIONS  (STANDING):  aspirin enteric coated 81 milliGRAM(s) Oral daily  carvedilol 12.5 milliGRAM(s) Oral every 12 hours  chlorhexidine 2% Cloths 1 Application(s) Topical <User Schedule>  enalapril 5 milliGRAM(s) Oral daily  furosemide   Injectable 40 milliGRAM(s) IV Push two times a day    MEDICATIONS  (PRN):  acetaminophen     Tablet .. 650 milliGRAM(s) Oral every 6 hours PRN Temp greater or equal to 38C (100.4F), Mild Pain (1 - 3)  aluminum hydroxide/magnesium hydroxide/simethicone Suspension 30 milliLiter(s) Oral every 4 hours PRN Dyspepsia  melatonin 3 milliGRAM(s) Oral at bedtime PRN Insomnia  ondansetron Injectable 4 milliGRAM(s) IV Push every 8 hours PRN Nausea and/or Vomiting

## 2023-11-01 NOTE — DIETITIAN INITIAL EVALUATION ADULT - OTHER INFO
Pt states UBW ~116 pounds  Dosing wt 109.3 pounds ?6% wt loss   122% IBW (IBW 90 pounds) 
htn controlled on meds now

## 2023-11-02 NOTE — PROGRESS NOTE ADULT - PROBLEM SELECTOR PLAN 1
-CT chest showing pleff, and came in with acute hypoxic respiratory failure 2/2 to HFrEF  - weaned off bipap, titrating oxygen now  - crackles improving - changed to lasix 40mg PO qD on 11/2   - TTE 11/1 showing: LVEF 32%, segmental WMAs, no lv thrombus, reduced RVSF, mild, moderate MR, severe mitral valve stenosis, moderate pulmonary HTN   - c/w asa 81, coreg 12.5BID, enalapril 5 qD  - cardiology recs appreciated -CT chest showing pleff, and came in with acute hypoxic respiratory failure 2/2 to HFrEF  - weaned off bipap, titrating oxygen now  - crackles improving - changed to lasix 40mg PO qD on 11/2   - TTE 11/1 showing: LVEF 32%, segmental WMAs, no lv thrombus, reduced RVSF, mild, moderate MR, severe mitral valve stenosis, moderate pulmonary HTN   - c/w asa 81, coreg 12.5BID, enalapril 5 qD  - cardiology recs appreciated  - weaned to 2L NC saturating 93% on 11/2

## 2023-11-02 NOTE — OCCUPATIONAL THERAPY INITIAL EVALUATION ADULT - TRANSFER TRAINING, PT EVAL
GOAL: Pt will complete transfer from [facility]  to independent in 4 weeks.

## 2023-11-02 NOTE — SWALLOW BEDSIDE ASSESSMENT ADULT - ASR SWALLOW ASPIRATION MONITOR
Monitor for s/s aspiration/laryngeal penetration. If noted:  D/C p.o. intake, provide non-oral nutrition/hydration/meds, and contact this service @ [phone]/change of breathing pattern/cough/gurgly voice/fever/pneumonia/throat clearing/upper respiratory infection

## 2023-11-02 NOTE — SWALLOW BEDSIDE ASSESSMENT ADULT - COMMENTS
Nutrition note appreciated. Significant weight loss; "gagging". Formal swallow evaluation ordered.    DNR/DNI    Not previously known to this service.

## 2023-11-02 NOTE — PROGRESS NOTE ADULT - NUTRITIONAL ASSESSMENT
This patient has been assessed with a concern for Malnutrition and has been determined to have a diagnosis/diagnoses of Severe protein-calorie malnutrition.    This patient is being managed with:   Diet Soft and Bite Sized-  Entered: Nov 2 2023 11:12AM

## 2023-11-02 NOTE — OCCUPATIONAL THERAPY INITIAL EVALUATION ADULT - PERTINENT HX OF CURRENT PROBLEM, REHAB EVAL
89F PMHx HTN, HLD, CAD? bradycardia s/p PPM, dementia. Presenting w/ progressively worsening SOB that is limiting her mobility x1week. Hx limited d/t pt's dementia and pt's son not knowing pt's hx fully. Pt has not seen physician in few yrs. of note, pt with documentation of DNR/DNI MOLST form, possibly leaning towards comfort care.  Pt is AAOx2-3 (correct to self and time; location was [place]" but couldn't seem to figure out she's at hospital). was able to recall three of her medications. Currently agreeable to keeping the bipap on, blood work and echocardiogram.   Currently reports some SOB (slightly better with bipap but not too much), and denies ha, dizziness, cp, abd pain, n/v/d, or urinary sx.   CT CHEST: No pulmonary embolism.Moderate bilateral pleural effusions. Nonspecific bilateral upper lobe predominant groundglass opacities and right upper lobe consolidations may represent pulmonary edema, inflammatory or infectious etiology.Left lower lobe compressive atelectasis versus consolidation.Prominent prevascular lymph nodes measuring up to 1 cm, which may be reactive.  XRAY CHEST: Small to moderate left pleural effusion.Bilateral hazy opacities, which may represent pneumonia versus mild pulmonary edema.

## 2023-11-02 NOTE — OCCUPATIONAL THERAPY INITIAL EVALUATION ADULT - RUE MMT, REHAB EVAL
Pre-op Diagnosis: Constipation, unspecified, nstipation type, History of colon polyps, Hemorrhoids, unspecified hemorrhoid type, Hematochezia    The above referenced H&P was reviewed by Chinyere Hernandes MD on 5/5/2021, the patient was examined and no significa
3/5

## 2023-11-02 NOTE — PROGRESS NOTE ADULT - TIME BILLING
congestive heart failure
heart failure
- Ordering, reviewing, and interpreting labs, testing, and imaging.  - Independently obtaining a review of systems and performing a physical exam  - Reviewing consultant documentation/recommendations in addition to discussing plan of care with consultants.  - Counselling and educating patient and family regarding interpretation of aforementioned items and plan of care.
- Ordering, reviewing, and interpreting labs, testing, and imaging.  - Independently obtaining a review of systems and performing a physical exam  - Reviewing consultant documentation/recommendations in addition to discussing plan of care with consultants.  - Counselling and educating patient and family regarding interpretation of aforementioned items and plan of care.

## 2023-11-02 NOTE — PROGRESS NOTE ADULT - PROBLEM SELECTOR PLAN 2
-c/w home med when able to take po med  -currently bp controlled
-c/w home med when able to take po med  -currently bp controlled

## 2023-11-02 NOTE — PROGRESS NOTE ADULT - PROBLEM SELECTOR PLAN 3
VTE ppx: scd  GI ppx: n/i  Etc: DNR/DNI, palliative consutled
VTE ppx: scd  GI ppx: n/i  Etc: DNR/DNI, possible LIZZETTE

## 2023-11-02 NOTE — OCCUPATIONAL THERAPY INITIAL EVALUATION ADULT - LIVES WITH, PROFILE
Per PT note, Pt lives in a [address] with 1st floor step-up + children on 2nd floor. Pt used a RW at baseline but was independent with mobility. Family reports pt had difficulty with bathing PTA. Pt owns RW + transport w/c./alone/children/friend

## 2023-11-02 NOTE — SWALLOW BEDSIDE ASSESSMENT ADULT - SLP PERTINENT HISTORY OF CURRENT PROBLEM
89 yo F admitted with Acute hypoxemic respiratory failure due to acute decompensated heart failure requiring BiPAP in the ED. Blood gas without hypercapnia. Significantly improved with diuresis. Now taken off BiPAP and placed on NC@2LPM. Goal SpO2>90%.

## 2023-11-02 NOTE — SWALLOW BEDSIDE ASSESSMENT ADULT - SWALLOW EVAL: DIAGNOSIS
89 yo F admitted with HF; now breathing comfortably on 2L O2 NC; reported c/o "food getting stuck in throat" however patient denies dysphagia at this time. Patient presents with a grossly functional oropharyngeal swallow vs presbyphagia. Mildly reduced/prolonged mastication in setting of advanced age, incomplete dentition and relatively dry oral mucosa. No overt signs of laryngeal penetration/aspiration. Would benefit from soft cut up solids, alternating solids/liquids, and min assist during meals in order to support efficiency and adequacy of intake.

## 2023-11-02 NOTE — PROGRESS NOTE ADULT - ASSESSMENT
89F PMHx HTN, HLD, 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 Severe LV systolic dysfunction with severe mitral stenosis seen on TTE.  -Volume status improving. Patient transitioned to po lasix. Would continue diuresis. Increase to furosemide 40 mg bid. Continue enalapril and coreg for BP control.  -Titrate oxygen to off.    Su Thorne MD  Cardiology Attending  [facility]    For day time coverage Mon-Fri see Non-Service Consult Attending on [email or web address], password: [email or web address]; daytime weekends covered by general cardiology consult service attending.)  
89F PMHx HTN, HLD, CAD? bradycardia s/p PPM, dementia with pulmonary edema and acute congestive heart failure exacerbation.  -Volume overloaded. Respiratory status appears improved. Still on oxygen via nasal cannula and still tachypneic.  -Continue furosemide 40 mg IV bid. Plan to decrease to daily lasix after today's dose.  -Continue coreg/enalapril  -Follow-up TTE    Su Thorne MD  Cardiology Attending  [facility]/ [facility]    For day time coverage Mon-Fri see Non-Service Consult Attending on [email or web address], password: [email or web address]; daytime weekends covered by general cardiology consult service attending.)  
Patient is a 89year old female w/ hx of Dementia, HTN, HLD, CAD? bradycardia s/p PPM who now presents with acute hypoxic respiratory failure requiring bi-level support which has been titrated off, admitted for AHRF 2/2 HFrEF.   
Patient is a 89year old female w/ hx of Dementia, HTN, HLD, CAD? bradycardia s/p PPM who now presents with acute hypoxic respiratory failure requiring bi-level support which has been titrated off, admitted for AHRF 2/2 HFrEF.

## 2023-11-02 NOTE — OCCUPATIONAL THERAPY INITIAL EVALUATION ADULT - NSOTDISCHREC_GEN_A_CORE
If home, Pt will need assist with all ADLs+ mobility + shower chair  2/2 to poor endurance, decreased balance  and gen weakness. Pt would benefit from home OT if to go home/Sub-acute Rehab

## 2023-11-03 NOTE — DISCHARGE NOTE NURSING/CASE MANAGEMENT/SOCIAL WORK - NSDCFUADDAPPT_GEN_ALL_CORE_FT
APPTS ARE READY TO BE MADE: [X ] YES    Best Family or Patient Contact (if needed):    Additional Information about above appointments (if needed):    1: PCP Dr. Martinez in 1 week   2:   3:     Other comments or requests:

## 2023-11-03 NOTE — DISCHARGE NOTE PROVIDER - NSDCFUADDAPPT_GEN_ALL_CORE_FT
APPTS ARE READY TO BE MADE: [X ] YES    Best Family or Patient Contact (if needed):    Additional Information about above appointments (if needed):    1: PCP Dr. Martinez  2:   3:     Other comments or requests:    APPTS ARE READY TO BE MADE: [X ] YES    Best Family or Patient Contact (if needed):    Additional Information about above appointments (if needed):    1: PCP Dr. Martinez in 1 week   2:   3:     Other comments or requests:    APPTS ARE READY TO BE MADE: [X ] YES    Best Family or Patient Contact (if needed):    Additional Information about above appointments (if needed):    1: PCP Dr. Martinez in 1 week   2:   3:     Other comments or requests:   Patient is homebound.

## 2023-11-03 NOTE — DISCHARGE NOTE PROVIDER - NSDCCPTREATMENT_GEN_ALL_CORE_FT
PRINCIPAL PROCEDURE  Procedure: 2D echo  Findings and Treatment:   CONCLUSIONS:      1. Left ventricular systolic function is severely decreased with an ejection fraction of 32 % by Stern's method of disks.   2. Multiple segmental abnormalities exist. See findings.   3. There is no evidence of a left ventricular thrombus.   4. The right ventricle is not well visualized. Mildly enlarged right ventricular cavity size, wall thickness, and reduced systolic function.   5. Device lead is visualized in the right heart.   6. Severe mitral annular calcification and calcified mitral leaflets with decreased opening.   7. Mild to moderate mitral regurgitation.   8. Severe mitral valve stenosis.   9. Trileaflet aortic valve with normal systolic excursion. calcification of the aortic valve leaflets.  10. Mild aortic regurgitation.  11. Estimated pulmonary artery systolic pressure is 55 mmHg, consistent with moderate pulmonary hypertension.  12. Bilateral pleural effusion noted.  13. No prior echocardiogram is available for comparison.

## 2023-11-03 NOTE — DISCHARGE NOTE PROVIDER - CARE PROVIDER_API CALL
Rashid Martinez  Phone: [phone]  Fax: (   )    -  Follow Up Time:    Rashid Martinez  Phone: [phone]  Fax: (   )    -  Follow Up Time:     David Rivas  Cardiology  [address], [address] [place] [id number]  Phone: [phone]  Fax: [phone]  Established Patient  Follow Up Time: 1 week

## 2023-11-03 NOTE — DISCHARGE NOTE PROVIDER - ATTENDING DISCHARGE PHYSICAL EXAMINATION:
CONSTITUTIONAL: Well-groomed, in no apparent distress  EYES: No conjunctival or scleral injection, non-icteric  ENMT: No external nasal lesions; MMM  RESPIRATORY: on nasal canula, minimal crackles in lower lobes, no wheezing.   CARDIOVASCULAR: +S1S2, RRR, no M/G/R; pedal pulses full and symmetric; no lower extremity edema  GASTROINTESTINAL: No tenderness, +BS throughout, no rebound/guarding  NEUROLOGIC: No gross focal neurological deficits, AAOX3  PSYCHIATRIC: mood and affect appropriate

## 2023-11-03 NOTE — DISCHARGE NOTE PROVIDER - PROVIDER TOKENS
FREE:[LAST:[Juan],FIRST:[Rashid],PHONE:[phone]:[(   )    -]] FREE:[LAST:[Juan],FIRST:[Rashid],PHONE:[phone]:[(   )    -]],PROVIDER:[TOKEN:[[id number]],FOLLOWUP:[1 week],ESTABLISHEDPATIENT:[T]]

## 2023-11-03 NOTE — DISCHARGE NOTE PROVIDER - NSDCMRMEDTOKEN_GEN_ALL_CORE_FT
aspirin 81 mg oral delayed release tablet: 1 tab(s) orally once a day  carvedilol 12.5 mg oral tablet: 1 tab(s) orally 2 times a day  enalapril 5 mg oral tablet: 1 tab(s) orally once a day   aspirin 81 mg oral delayed release tablet: 1 tab(s) orally once a day  carvedilol 12.5 mg oral tablet: 1 tab(s) orally 2 times a day  enalapril 5 mg oral tablet: 1 tab(s) orally once a day  furosemide 40 mg oral tablet: 1 tab(s) orally 2 times a day

## 2023-11-03 NOTE — PROGRESS NOTE ADULT - SUBJECTIVE AND OBJECTIVE BOX
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   Presenting w/ progressively worsening SOB that is limiting her mobility x1week.   Hx limited d/t pt's dementia and pt's son not knowing pt's hx fully. Pt has not seen physician in few yrs.     On arrival, afebrile, /78, pulse wnl, and 91% on RA -> 100% on 2LNC -> bipap put on d/t increased wob   CBC w/ wbc 10.5, hgb 11.8, and plt 287, coag wnl.   CMP nonactionable. Trop 1418 -> 1179, proBNP 71575, CKMB 6.4  VBG w/ pH 7.43, lactic 2.4, pCO2 36, PO2 31, and HCO3 24.   COVID, flu a/b, and rsv neg.   CTA chest w/o PE but showed b/l mod PLEF, nonspecific predominately upper GGO and  RUL consolidation, LLL compressive atelectasis vs. consolidation. Prominent prevascular lymph nodes measuring up to 1 cm, which may be reactive.    Seen by cards in the ED, EKG a/v-sensed and w/o ischemic changes meeting STEMI criteria. Rec TTE, monitor UOP s/p lasix in the ED, trend trop/ckmb/cpk/lactic.     of note, pt with documentation of DNR/DNI MOLST form, possibly leaning towards comfort care.  Pt is AAOx2-3 (correct to self and time; location was [place]" but couldn't seem to figure out she's at hospital). was able to recall three of her medications. Currently agreeable to keeping the bipap on, blood work and echocardiogram.   Currently reports some SOB (slightly better with bipap but not too much), and denies ha, dizziness, cp, abd pain, n/v/d, or urinary sx.    (31 Oct 2023 05:19)          Allergies    No Known Allergies    Intolerances    	    MEDICATIONS:  aspirin enteric coated 81 milliGRAM(s) Oral daily  carvedilol 12.5 milliGRAM(s) Oral every 12 hours  enalapril 5 milliGRAM(s) Oral daily  furosemide   Injectable 40 milliGRAM(s) IV Push two times a day        acetaminophen     Tablet .. 650 milliGRAM(s) Oral every 6 hours PRN  melatonin 3 milliGRAM(s) Oral at bedtime PRN  ondansetron Injectable 4 milliGRAM(s) IV Push every 8 hours PRN    aluminum hydroxide/magnesium hydroxide/simethicone Suspension 30 milliLiter(s) Oral every 4 hours PRN      chlorhexidine 2% Cloths 1 Application(s) Topical <User Schedule>      PAST MEDICAL & SURGICAL HISTORY:  HTN (hypertension)      HLD (hyperlipidemia)      Pacemaker          FAMILY HISTORY:      SOCIAL HISTORY:  unchanged    REVIEW OF SYSTEMS:  CONSTITUTIONAL: No fever, weight loss, or fatigue  EYES: No eye pain, visual disturbances, or discharge  ENMT:  No difficulty hearing, tinnitus, vertigo; No sinus or throat pain  NECK: No pain or stiffness  RESPIRATORY: No cough, wheezing, chills or hemoptysis; No Shortness of Breath  CARDIOVASCULAR: No chest pain, palpitations, passing out, dizziness, or leg swelling  GASTROINTESTINAL: No abdominal or epigastric pain. No nausea, vomiting, or hematemesis; No diarrhea or constipation. No melena or hematochezia.  GENITOURINARY: No dysuria, frequency, hematuria, or incontinence  NEUROLOGICAL: No headaches, memory loss, loss of strength, numbness, or tremors  SKIN: No itching, burning, rashes, or lesions   LYMPH Nodes: No enlarged glands  ENDOCRINE: No heat or cold intolerance; No hair loss  MUSCULOSKELETAL: No joint pain or swelling; No muscle, back, or extremity pain  PSYCHIATRIC: No depression, anxiety, mood swings, or difficulty sleeping  HEME/LYMPH: No easy bruising, or bleeding gums  ALLERY AND IMMUNOLOGIC: No hives or eczema	    [ ] All others negative	  [ ] Unable to obtain    PHYSICAL EXAM:  T(C): 36.7 (11-01-23 @ 08:19), Max: 36.7 (11-01-23 @ 08:19)  HR: 100 (11-01-23 @ 08:19) (90 - 100)  BP: 122/66 (11-01-23 @ 08:19) (101/58 - 129/64)  RR: 18 (11-01-23 @ 08:19) (17 - 23)  SpO2: 94% (11-01-23 @ 08:19) (91% - 98%)  Wt(kg): --  I&O's Summary    31 Oct 2023 07:01  -  01 Nov 2023 07:00  --------------------------------------------------------  IN: 80 mL / OUT: 0 mL / NET: 80 mL        Appearance: Normal	  HEENT:   Normal oral mucosa, PERRL, EOMI	  Lymphatic: No lymphadenopathy  Cardiovascular: Normal S1 S2, No JVD, No murmurs, No edema  Respiratory: Lungs clear to auscultation	  Psychiatry: A & O x 3, Mood & affect appropriate  Gastrointestinal:  Soft, Non-tender, + BS	  Skin: No rashes, No ecchymoses, No cyanosis	  Neurologic: Non-focal  Extremities: Normal range of motion, No clubbing, cyanosis or edema  Vascular: Peripheral pulses palpable 2+ bilaterally      LABS:	 	    CBC Full  -  ( 31 Oct 2023 00:42 )  WBC Count : 10.55 K/uL  Hemoglobin : 11.8 g/dL  Hematocrit : 36.5 %  Platelet Count - Automated : 287 K/uL  Mean Cell Volume : 92.9 fl  Mean Cell Hemoglobin : 30.0 pg  Mean Cell Hemoglobin Concentration : 32.3 gm/dL  Auto Neutrophil # : 8.74 K/uL  Auto Lymphocyte # : 0.96 K/uL  Auto Monocyte # : 0.69 K/uL  Auto Eosinophil # : 0.04 K/uL  Auto Basophil # : 0.03 K/uL  Auto Neutrophil % : 82.8 %  Auto Lymphocyte % : 9.1 %  Auto Monocyte % : 6.5 %  Auto Eosinophil % : 0.4 %  Auto Basophil % : 0.3 %    11-01    139  |  98  |  36<H>  ----------------------------<  123<H>  3.4<L>   |  22  |  1.00  10-31    135  |  100  |  39<H>  ----------------------------<  155<H>  4.5   |  21<L>  |  0.97    Ca    9.4      01 Nov 2023 07:26  Ca    9.6      31 Oct 2023 00:42  Mg     2.5     11-01    TPro  7.6  /  Alb  3.8  /  TBili  1.1  /  DBili  x   /  AST  18  /  ALT  32  /  AlkPhos  132<H>  10-31          
SUBJ:  no CP, np SOB, now on po lasix    MEDICATIONS  (STANDING):  aspirin enteric coated 81 milliGRAM(s) Oral daily  carvedilol 12.5 milliGRAM(s) Oral every 12 hours  chlorhexidine 2% Cloths 1 Application(s) Topical <User Schedule>  enalapril 5 milliGRAM(s) Oral daily  furosemide    Tablet 40 milliGRAM(s) Oral daily    MEDICATIONS  (PRN):  acetaminophen     Tablet .. 650 milliGRAM(s) Oral every 6 hours PRN Temp greater or equal to 38C (100.4F), Mild Pain (1 - 3)  aluminum hydroxide/magnesium hydroxide/simethicone Suspension 30 milliLiter(s) Oral every 4 hours PRN Dyspepsia  melatonin 3 milliGRAM(s) Oral at bedtime PRN Insomnia  ondansetron Injectable 4 milliGRAM(s) IV Push every 8 hours PRN Nausea and/or Vomiting      Vital Signs Last 24 Hrs  T(C): 36.4 (03 Nov 2023 04:00), Max: 36.7 (02 Nov 2023 20:47)  T(F): 97.5 (03 Nov 2023 04:00), Max: 98 (02 Nov 2023 20:47)  HR: 78 (03 Nov 2023 08:29) (72 - 87)  BP: 96/60 (03 Nov 2023 08:29) (91/52 - 100/59)  BP(mean): --  RR: 18 (03 Nov 2023 10:10) (18 - 20)  SpO2: 93% (03 Nov 2023 10:10) (85% - 97%)    Parameters below as of 03 Nov 2023 10:10  Patient On (Oxygen Delivery Method): nasal cannula  O2 Flow (L/min): 2      REVIEW OF SYSTEMS:  CONSTITUTIONAL: No fever, weight loss, or fatigue  EYES: No eye pain, visual disturbances, or discharge  ENMT:  No difficulty hearing, tinnitus, vertigo; No sinus or throat pain  NECK: No pain or stiffness  RESPIRATORY: No cough, wheezing, chills or hemoptysis; No shortness of breath  CARDIOVASCULAR: No chest pain, palpitations, dizziness, or leg swelling  GASTROINTESTINAL: No abdominal or epigastric pain. No nausea, vomiting, or hematemesis; No diarrhea or constipation. No melena or hematochezia.  GENITOURINARY: No dysuria, frequency, hematuria, or incontinence  NEUROLOGICAL: No headaches, memory loss, loss of strength, numbness, or tremors  SKIN: No itching, burning, rashes, or lesions   LYMPH NODES: No enlarged glands  ENDOCRINE: No heat or cold intolerance; No hair loss  MUSCULOSKELETAL: No joint pain or swelling; No muscle, back, or extremity pain  PSYCHIATRIC: No depression, anxiety, mood swings, or difficulty sleeping  HEME/LYMPH: No easy bruising, or bleeding gums  ALLERY AND IMMUNOLOGIC: No hives or eczema          PHYSICAL EXAM:  · CONSTITUTIONAL: Well-developed, well nourished      · EYES: EOMI; PERRL; no drainage or redness  · NECK: No bruits; no thyromegaly or nodules  ·RESPIRATORY:   airway patent; breath sounds equal; good air movement; respirations non-labored; clear to auscultation bilaterally; no chest wall tenderness; no intercostal retractions; no rales,rhonchi or wheeze  · CARDIOVASCULAR: regular rate and rhythm  no rub  no murmur  normal PMI  . GASTROINTESTINAL:  no distention; no masses palpable; bowel sounds normal  · EXTREMITIES: No cyanosis, clubbing or edema  · VASCULAR:  Equal and normal pulses (radial, femoral)  ·NEUROLOGICAL:  Alert and oriented x 3; sensation intact; deep reflexes intact; cranial nerves intact; no spontaneous movement; superficial reflexes intact; normal strength  · SKIN: No lesions; no rash  . LYMPH NODES: No lymphadedenopathy  · MUSCULOSKELETAL:  No calf tenderness  no joint swelling	      LABS:   CBC Full  -  ( 03 Nov 2023 06:26 )  WBC Count : 10.18 K/uL  RBC Count : 3.14 M/uL  Hemoglobin : 9.7 g/dL  Hematocrit : 29.7 %  Platelet Count - Automated : 272 K/uL  Mean Cell Volume : 94.6 fl  Mean Cell Hemoglobin : 30.9 pg  Mean Cell Hemoglobin Concentration : 32.7 gm/dL  Auto Neutrophil # : x  Auto Lymphocyte # : x  Auto Monocyte # : x  Auto Eosinophil # : x  Auto Basophil # : x  Auto Neutrophil % : x  Auto Lymphocyte % : x  Auto Monocyte % : x  Auto Eosinophil % : x  Auto Basophil % : x      11-03    137  |  101  |  43<H>  ----------------------------<  119<H>  4.3   |  22  |  1.09    Ca    9.2      03 Nov 2023 06:26  Phos  3.5     11-03  Mg     2.4     11-03            RADIOLOGY & ADDITIONAL STUDIES:    IMPRESSION AND PLAN:  BECKI OLIVEIRA is a 89F PMHx HTN, HLD, 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 Severe LV systolic dysfunction with severe mitral stenosis seen on TTE.  -near euvolemic on PO lasix 40mg po bid   Continue enalapril and coreg for BP control.  -Titrate oxygen to off.     outpt fu with her cardiologist in 2-4 weeks to titrate diuresis    final cardiology recommendations above, please call with questions    David Rivas MD, PhD  Cardiology Attending  [facility]/ [facility]    For day time coverage Mon-Fri see Non-Service Consult Attending on [email or web address], password: [email or web address]; daytime weekends covered by general cardiology consult service attending.)            
                            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   Presenting w/ progressively worsening SOB that is limiting her mobility x1week.   Hx limited d/t pt's dementia and pt's son not knowing pt's hx fully. Pt has not seen physician in few yrs.     On arrival, afebrile, /78, pulse wnl, and 91% on RA -> 100% on 2LNC -> bipap put on d/t increased wob   CBC w/ wbc 10.5, hgb 11.8, and plt 287, coag wnl.   CMP nonactionable. Trop 1418 -> 1179, proBNP 40997, CKMB 6.4  VBG w/ pH 7.43, lactic 2.4, pCO2 36, PO2 31, and HCO3 24.   COVID, flu a/b, and rsv neg.   CTA chest w/o PE but showed b/l mod PLEF, nonspecific predominately upper GGO and  RUL consolidation, LLL compressive atelectasis vs. consolidation. Prominent prevascular lymph nodes measuring up to 1 cm, which may be reactive.    Seen by cards in the ED, EKG a/v-sensed and w/o ischemic changes meeting STEMI criteria. Rec TTE, monitor UOP s/p lasix in the ED, trend trop/ckmb/cpk/lactic.     of note, pt with documentation of DNR/DNI MOLST form, possibly leaning towards comfort care.  Pt is AAOx2-3 (correct to self and time; location was [place]" but couldn't seem to figure out she's at hospital). was able to recall three of her medications. Currently agreeable to keeping the bipap on, blood work and echocardiogram.   Currently reports some SOB (slightly better with bipap but not too much), and denies ha, dizziness, cp, abd pain, n/v/d, or urinary sx.    (31 Oct 2023 05:19)          Allergies    No Known Allergies    Intolerances    	    MEDICATIONS:  aspirin enteric coated 81 milliGRAM(s) Oral daily  carvedilol 12.5 milliGRAM(s) Oral every 12 hours  enalapril 5 milliGRAM(s) Oral daily  furosemide    Tablet 40 milliGRAM(s) Oral daily        acetaminophen     Tablet .. 650 milliGRAM(s) Oral every 6 hours PRN  melatonin 3 milliGRAM(s) Oral at bedtime PRN  ondansetron Injectable 4 milliGRAM(s) IV Push every 8 hours PRN    aluminum hydroxide/magnesium hydroxide/simethicone Suspension 30 milliLiter(s) Oral every 4 hours PRN      chlorhexidine 2% Cloths 1 Application(s) Topical <User Schedule>      PAST MEDICAL & SURGICAL HISTORY:  HTN (hypertension)      HLD (hyperlipidemia)      Pacemaker          FAMILY HISTORY:      SOCIAL HISTORY:  unchanged    REVIEW OF SYSTEMS:  CONSTITUTIONAL: No fever, weight loss, or fatigue  EYES: No eye pain, visual disturbances, or discharge  ENMT:  No difficulty hearing, tinnitus, vertigo; No sinus or throat pain  NECK: No pain or stiffness  RESPIRATORY: No cough, wheezing, chills or hemoptysis; No Shortness of Breath  CARDIOVASCULAR: No chest pain, palpitations, passing out, dizziness, or leg swelling  GASTROINTESTINAL: No abdominal or epigastric pain. No nausea, vomiting, or hematemesis; No diarrhea or constipation. No melena or hematochezia.  GENITOURINARY: No dysuria, frequency, hematuria, or incontinence  NEUROLOGICAL: No headaches, memory loss, loss of strength, numbness, or tremors  SKIN: No itching, burning, rashes, or lesions   LYMPH Nodes: No enlarged glands  ENDOCRINE: No heat or cold intolerance; No hair loss  MUSCULOSKELETAL: No joint pain or swelling; No muscle, back, or extremity pain  PSYCHIATRIC: No depression, anxiety, mood swings, or difficulty sleeping  HEME/LYMPH: No easy bruising, or bleeding gums  ALLERY AND IMMUNOLOGIC: No hives or eczema	    [ ] All others negative	  [ ] Unable to obtain    PHYSICAL EXAM:  T(C): 36.6 (11-02-23 @ 12:14), Max: 36.6 (11-02-23 @ 05:48)  HR: 82 (11-02-23 @ 12:37) (78 - 95)  BP: 96/63 (11-02-23 @ 12:37) (86/55 - 105/64)  RR: 18 (11-02-23 @ 12:37) (18 - 18)  SpO2: 95% (11-02-23 @ 12:37) (93% - 99%)  Wt(kg): --  I&O's Summary    01 Nov 2023 07:01  -  02 Nov 2023 07:00  --------------------------------------------------------  IN: 480 mL / OUT: 200 mL / NET: 280 mL    02 Nov 2023 07:01  -  02 Nov 2023 13:40  --------------------------------------------------------  IN: 0 mL / OUT: 200 mL / NET: -200 mL        Appearance: Normal	  HEENT:   Normal oral mucosa, PERRL, EOMI	  Lymphatic: No lymphadenopathy  Cardiovascular: Normal S1 S2, No JVD, No murmurs, No edema  Respiratory: Lungs clear to auscultation	  Psychiatry: A & O x 3, Mood & affect appropriate  Gastrointestinal:  Soft, Non-tender, + BS	  Skin: No rashes, No ecchymoses, No cyanosis	  Neurologic: Non-focal  Extremities: Normal range of motion, No clubbing, cyanosis or edema  Vascular: Peripheral pulses palpable 2+ bilaterally      LABS:	 	    CBC Full  -  ( 02 Nov 2023 11:27 )  WBC Count : 11.20 K/uL  Hemoglobin : 10.0 g/dL  Hematocrit : 29.8 %  Platelet Count - Automated : 262 K/uL  Mean Cell Volume : 93.7 fl  Mean Cell Hemoglobin : 31.4 pg  Mean Cell Hemoglobin Concentration : 33.6 gm/dL  Auto Neutrophil # : 9.41 K/uL  Auto Lymphocyte # : 0.60 K/uL  Auto Monocyte # : 0.82 K/uL  Auto Eosinophil # : 0.25 K/uL  Auto Basophil # : 0.03 K/uL  Auto Neutrophil % : 84.0 %  Auto Lymphocyte % : 5.4 %  Auto Monocyte % : 7.3 %  Auto Eosinophil % : 2.2 %  Auto Basophil % : 0.3 %    11-02    137  |  101  |  43<H>  ----------------------------<  170<H>  4.6   |  22  |  1.04  11-01    139  |  98  |  36<H>  ----------------------------<  123<H>  3.4<L>   |  22  |  1.00    Ca    8.8      02 Nov 2023 11:27  Ca    9.4      01 Nov 2023 07:26  Phos  3.1     11-02  Mg     2.5     11-02  Mg     2.5     11-01            
[facility] Division of Hospital Medicine  Navin Mcgregor MD  Available via MS Teams  Pager: [phone]    SUBJECTIVE / OVERNIGHT EVENTS:  - no events overnight, this morning patient is AOx3, but is somewhat sleepy. Denies any nausea, vomiting, headaches, but still has cough and shortness of breath.     MEDICATIONS  (STANDING):  aspirin enteric coated 81 milliGRAM(s) Oral daily  carvedilol 12.5 milliGRAM(s) Oral every 12 hours  chlorhexidine 2% Cloths 1 Application(s) Topical <User Schedule>  enalapril 5 milliGRAM(s) Oral daily  furosemide   Injectable 40 milliGRAM(s) IV Push two times a day    MEDICATIONS  (PRN):  acetaminophen     Tablet .. 650 milliGRAM(s) Oral every 6 hours PRN Temp greater or equal to 38C (100.4F), Mild Pain (1 - 3)  aluminum hydroxide/magnesium hydroxide/simethicone Suspension 30 milliLiter(s) Oral every 4 hours PRN Dyspepsia  melatonin 3 milliGRAM(s) Oral at bedtime PRN Insomnia  ondansetron Injectable 4 milliGRAM(s) IV Push every 8 hours PRN Nausea and/or Vomiting      I&O's Summary    31 Oct 2023 07:01  -  01 Nov 2023 07:00  --------------------------------------------------------  IN: 80 mL / OUT: 0 mL / NET: 80 mL        PHYSICAL EXAM:  Vital Signs Last 24 Hrs  T(C): 36.6 (01 Nov 2023 11:00), Max: 36.7 (01 Nov 2023 08:19)  T(F): 97.8 (01 Nov 2023 11:00), Max: 98 (01 Nov 2023 08:19)  HR: 93 (01 Nov 2023 11:00) (90 - 100)  BP: 96/63 (01 Nov 2023 11:00) (96/63 - 129/64)  BP(mean): --  RR: 18 (01 Nov 2023 11:00) (17 - 23)  SpO2: 97% (01 Nov 2023 11:00) (91% - 98%)    Parameters below as of 01 Nov 2023 11:00  Patient On (Oxygen Delivery Method): nasal cannula    CONSTITUTIONAL: Well-groomed, in no apparent distress  EYES: No conjunctival or scleral injection, non-icteric  ENMT: No external nasal lesions; MMM  RESPIRATORY: on nasal canula, diminished BS w/  moderate crackles at lower lung fields, no wheezing.   CARDIOVASCULAR: +S1S2, RRR, no M/G/R; pedal pulses full and symmetric; no lower extremity edema  GASTROINTESTINAL: No tenderness, +BS throughout, no rebound/guarding  NEUROLOGIC: No gross focal neurological deficits, AAOX2-3  PSYCHIATRIC: mood and affect appropriate    LABS:                        11.8   10.55 )-----------( 287      ( 31 Oct 2023 00:42 )             36.5     11-01    139  |  98  |  36<H>  ----------------------------<  123<H>  3.4<L>   |  22  |  1.00    Ca    9.4      01 Nov 2023 07:26  Mg     2.5     11-01    TPro  7.6  /  Alb  3.8  /  TBili  1.1  /  DBili  x   /  AST  18  /  ALT  32  /  AlkPhos  132<H>  10-31    PT/INR - ( 31 Oct 2023 00:42 )   PT: 12.0 sec;   INR: 1.15 ratio         PTT - ( 31 Oct 2023 00:42 )  PTT:26.6 sec  CARDIAC MARKERS ( 31 Oct 2023 02:52 )  x     / x     / 129 U/L / x     / 6.4 ng/mL      Urinalysis Basic - ( 01 Nov 2023 07:26 )    Color: x / Appearance: x / SG: x / pH: x  Gluc: 123 mg/dL / Ketone: x  / Bili: x / Urobili: x   Blood: x / Protein: x / Nitrite: x   Leuk Esterase: x / RBC: x / WBC x   Sq Epi: x / Non Sq Epi: x / Bacteria: x        Culture - Blood (collected 31 Oct 2023 05:01)  Source: .Blood Blood-Peripheral  Preliminary Report (01 Nov 2023 09:01):    No growth at 24 hours    Culture - Blood (collected 31 Oct 2023 04:51)  Source: .Blood Blood-Peripheral  Preliminary Report (01 Nov 2023 09:01):    No growth at 24 hours  
[facility] Division of Hospital Medicine  Navin Mcgregor MD  Available via MS Teams  Pager: [phone]    SUBJECTIVE / OVERNIGHT EVENTS:  - seen and evaluated at bedside, no acute issues. states she feels weak but wants to start walking or at least get to the chair. denies any nausea, vomiting, headaches, shortness of breath, cough.     MEDICATIONS  (STANDING):  aspirin enteric coated 81 milliGRAM(s) Oral daily  carvedilol 12.5 milliGRAM(s) Oral every 12 hours  chlorhexidine 2% Cloths 1 Application(s) Topical <User Schedule>  enalapril 5 milliGRAM(s) Oral daily  furosemide    Tablet 40 milliGRAM(s) Oral daily    MEDICATIONS  (PRN):  acetaminophen     Tablet .. 650 milliGRAM(s) Oral every 6 hours PRN Temp greater or equal to 38C (100.4F), Mild Pain (1 - 3)  aluminum hydroxide/magnesium hydroxide/simethicone Suspension 30 milliLiter(s) Oral every 4 hours PRN Dyspepsia  melatonin 3 milliGRAM(s) Oral at bedtime PRN Insomnia  ondansetron Injectable 4 milliGRAM(s) IV Push every 8 hours PRN Nausea and/or Vomiting      I&O's Summary    01 Nov 2023 07:01  -  02 Nov 2023 07:00  --------------------------------------------------------  IN: 480 mL / OUT: 200 mL / NET: 280 mL        PHYSICAL EXAM:  Vital Signs Last 24 Hrs  T(C): 36.6 (02 Nov 2023 05:48), Max: 36.6 (02 Nov 2023 05:48)  T(F): 97.8 (02 Nov 2023 05:48), Max: 97.8 (02 Nov 2023 05:48)  HR: 78 (02 Nov 2023 11:35) (78 - 95)  BP: 93/50 (02 Nov 2023 11:35) (86/55 - 105/64)  BP(mean): --  RR: 18 (02 Nov 2023 08:24) (18 - 18)  SpO2: 93% (02 Nov 2023 11:35) (93% - 99%)    Parameters below as of 02 Nov 2023 11:35  Patient On (Oxygen Delivery Method): nasal cannula  O2 Flow (L/min): 2    CONSTITUTIONAL: Well-groomed, in no apparent distress  EYES: No conjunctival or scleral injection, non-icteric  ENMT: No external nasal lesions; MMM  RESPIRATORY: on nasal canula, diminished BS w/  minimal crackles, no wheezing.   CARDIOVASCULAR: +S1S2, RRR, no M/G/R; pedal pulses full and symmetric; no lower extremity edema  GASTROINTESTINAL: No tenderness, +BS throughout, no rebound/guarding  NEUROLOGIC: No gross focal neurological deficits, AAOX2-3  PSYCHIATRIC: mood and affect appropriate    LABS:                        10.0   11.20 )-----------( 262      ( 02 Nov 2023 11:27 )             29.8     11-01    139  |  98  |  36<H>  ----------------------------<  123<H>  3.4<L>   |  22  |  1.00    Ca    9.4      01 Nov 2023 07:26  Mg     2.5     11-01            Urinalysis Basic - ( 01 Nov 2023 07:26 )    Color: x / Appearance: x / SG: x / pH: x  Gluc: 123 mg/dL / Ketone: x  / Bili: x / Urobili: x   Blood: x / Protein: x / Nitrite: x   Leuk Esterase: x / RBC: x / WBC x   Sq Epi: x / Non Sq Epi: x / Bacteria: x        Culture - Blood (collected 31 Oct 2023 05:01)  Source: .Blood Blood-Peripheral  Preliminary Report (02 Nov 2023 09:02):    No growth at 48 Hours    Culture - Blood (collected 31 Oct 2023 04:51)  Source: .Blood Blood-Peripheral  Preliminary Report (02 Nov 2023 09:02):    No growth at 48 Hours

## 2023-11-03 NOTE — DISCHARGE NOTE PROVIDER - NSDCCPCAREPLAN_GEN_ALL_CORE_FT
[id number]
PRINCIPAL DISCHARGE DIAGNOSIS  Diagnosis: CHF exacerbation  Assessment and Plan of Treatment: You were admitted for acute heart failure exacerbation and required IV lasix   You are now on oral medication of Lasix and are to continue taking this  You require oxygen on discharge as your oxygen was too low on room air.   Please follow up with PCP for further management   Make sure you weight yourself daily    Weigh yourself daily.  If you gain 3lbs in 3 days, or 5lbs in a week call your Health Care Provider.  Eat a low sodium diet.  If you have pulmonary hypertension and you are a female of child bearing age, talk to your caregiver about using birth control pills or getting pregnant.  Call your Health Care Provider if you have any swelling or increased swelling in your feet, ankles, and/or stomach.  If you experience dizziness, chest pain, or shortness of breath, seek immediate medical attention.  Take all medications as prescribed.  Stop smoking if you currently If you are on medication to help you quit smoking, be sure to take it as prescribed. Find healthy ways to deal with stress, such as exercise (check with your healthcare provider first), deep breathing, meditation, or enjoyable healthy hobbies.  Avoid situations that may cause you to smoke a cigarette.  Look for help with quitting; you are not alone. A resource to help you stop smoking is the [facility] – phone number 142-824-6974., and avoid high altitudes.

## 2023-11-03 NOTE — DISCHARGE NOTE PROVIDER - CARE PROVIDERS DIRECT ADDRESSES
,DirectAddress_Unknown ,DirectAddress_Unknown,[email or web address]

## 2023-11-03 NOTE — CHART NOTE - NSCHARTNOTEFT_GEN_A_CORE
RD CHF education chart note    Patient was visited by RD for CHF education however nutrition education not appropriate at this time per pt's reduced intake and advanced age. Discussed monitoring wt trends related to fluid accumulation risks. pt and stepson made aware RD remains available    Apple Little, MS, RD, CDN (Teams)
Spoke with Dr. Mcgregor consult to be cancelled as pt status improved.
acute on chronic decompensated congestive heart failure exac  - will wean off bipap, if unable to give another dose of iv diuresis-- for now, no further diuresis  - TTE pending  - DNR/DNI- family considering comfort care, palliative care consult placed    Mera Roe D.O.  Division of Hospital Medicine  Available on MS Teams
Patient presents with a diagnosis of Heart Failure.  Acute exacerbation is currently resolved and patient will require 2 liters of continuous oxygen at home for CHF. Room air at rest oxygen saturation is 85 %.     [id number]

## 2023-11-03 NOTE — DISCHARGE NOTE PROVIDER - NSFOLLOWUPCLINICS_GEN_ALL_ED_FT
[facility] General Internal Medicine  General Internal Medicine  [address] [place] [address]  Phone: [phone]  Fax:     [facility]  Internal Medicine  [address] [place] [address]  Phone: [phone]  Fax: [phone]

## 2023-11-03 NOTE — DISCHARGE NOTE PROVIDER - HOSPITAL COURSE
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   Presenting w/ progressively worsening SOB that is limiting her mobility x1week.   Hx limited d/t pt's dementia and pt's son not knowing pt's hx fully. Pt has not seen physician in few yrs.     On arrival, afebrile, /78, pulse wnl, and 91% on RA -> 100% on 2LNC -> bipap put on d/t increased wob   CBC w/ wbc 10.5, hgb 11.8, and plt 287, coag wnl.   CMP nonactionable. Trop 1418 -> 1179, proBNP 71119, CKMB 6.4  VBG w/ pH 7.43, lactic 2.4, pCO2 36, PO2 31, and HCO3 24.   COVID, flu a/b, and rsv neg.   CTA chest w/o PE but showed b/l mod PLEF, nonspecific predominately upper GGO and  RUL consolidation, LLL compressive atelectasis vs. consolidation. Prominent prevascular lymph nodes measuring up to 1 cm, which may be reactive.    Seen by cards in the ED, EKG a/v-sensed and w/o ischemic changes meeting STEMI criteria. Rec TTE, monitor UOP s/p lasix in the ED, trend trop/ckmb/cpk/lactic.     of note, pt with documentation of DNR/DNI MOLST form, possibly leaning towards comfort care.  Pt is AAOx2-3 (correct to self and time; location was [place]" but couldn't seem to figure out she's at hospital). was able to recall three of her medications. Currently agreeable to keeping the bipap on, blood work and echocardiogram.   Currently reports some SOB (slightly better with bipap but not too much), and denies ha, dizziness, cp, abd pain, n/v/d, or urinary sx.    (31 Oct 2023 05:19)    Hospital Course:  Patient admitted for acute hypoxic respiratory failure due to acute CHF exacerbation, treated with IV lasix and transitioned to oral. She was seen by cardiology and pulmonary and had imaging with CT chest and echo on 11/1 showing: LVEF 32%, segmental WMAs, no lv thrombus, reduced RVSF, mild, moderate MR, severe mitral valve stenosis, moderate pulmonary HTN. Patient placed on asa 81, coreg 12.5BID, enalapril 5 qD. Cardiology clearance obtained but patient desat to 85% on room air despite heart failure exacerbation resolved. Awaiting home O2 set up and delivery.     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   Presenting w/ progressively worsening SOB that is limiting her mobility x1week.   Hx limited d/t pt's dementia and pt's son not knowing pt's hx fully. Pt has not seen physician in few yrs.     On arrival, afebrile, /78, pulse wnl, and 91% on RA -> 100% on 2LNC -> bipap put on d/t increased wob   CBC w/ wbc 10.5, hgb 11.8, and plt 287, coag wnl.   CMP nonactionable. Trop 1418 -> 1179, proBNP 26668, CKMB 6.4  VBG w/ pH 7.43, lactic 2.4, pCO2 36, PO2 31, and HCO3 24.   COVID, flu a/b, and rsv neg.   CTA chest w/o PE but showed b/l mod PLEF, nonspecific predominately upper GGO and  RUL consolidation, LLL compressive atelectasis vs. consolidation. Prominent prevascular lymph nodes measuring up to 1 cm, which may be reactive.    Seen by cards in the ED, EKG a/v-sensed and w/o ischemic changes meeting STEMI criteria. Rec TTE, monitor UOP s/p lasix in the ED, trend trop/ckmb/cpk/lactic.     of note, pt with documentation of DNR/DNI MOLST form, possibly leaning towards comfort care.  Pt is AAOx2-3 (correct to self and time; location was [place]" but couldn't seem to figure out she's at hospital). was able to recall three of her medications. Currently agreeable to keeping the bipap on, blood work and echocardiogram.   Currently reports some SOB (slightly better with bipap but not too much), and denies ha, dizziness, cp, abd pain, n/v/d, or urinary sx.    (31 Oct 2023 05:19)    Hospital Course:  Patient admitted for acute hypoxic respiratory failure due to acute CHF exacerbation, treated with IV lasix and transitioned to oral. She was seen by cardiology and pulmonary and had imaging with CT chest and echo on 11/1 showing: LVEF 32%, segmental WMAs, no lv thrombus, reduced RVSF, mild, moderate MR, severe mitral valve stenosis, moderate pulmonary HTN. Patient placed on asa 81, coreg 12.5BID, enalapril 5 qD. Cardiology clearance obtained but patient desat to 85% on room air despite heart failure exacerbation resolved. Awaiting home O2 set up and delivery.     Important Medication Changes and Reason:  Home Oxygen for CHF  Lasix for CHF    Active or Pending Issues Requiring Follow-up:  PCP     Advanced Directives:   [ ] Full code  [ X] DNR  [ ] Hospice    Discharge Diagnoses:  Acute Heart failure Patient is a 89year old female w/ hx of Dementia, HTN, HLD, CAD? bradycardia s/p PPM who now presents with acute hypoxic respiratory failure requiring bi-level support which has been titrated off, admitted for AHRF 2/2 HFrEF.     Hospital Course:  Patient admitted for acute hypoxic respiratory failure due to acute CHF exacerbation, treated with IV lasix and transitioned to oral. She was seen by cardiology and pulmonary and had imaging with CT chest and echo on 11/1 showing: LVEF 32%, segmental WMAs, no lv thrombus, reduced RVSF, mild, moderate MR, severe mitral valve stenosis, moderate pulmonary HTN. Patient placed on asa 81, coreg 12.5BID, enalapril 5 qD. Cardiology clearance obtained but patient desat to 85% on room air despite heart failure exacerbation resolved. Awaiting home O2 set up and delivery.     Important Medication Changes and Reason:  -Home Oxygen for CHF  -Lasix 40mg BID for CHF    Active or Pending Issues Requiring Follow-up:  PCP     Advanced Directives:   [ ] Full code  [ X] DNR  [ ] Hospice    Discharge Diagnoses:  #Acute on chronic heart failure with reduced ejection fraction

## 2023-11-09 PROBLEM — E78.5 HYPERLIPIDEMIA, UNSPECIFIED: Chronic | Status: ACTIVE | Noted: 2023-01-01

## 2023-11-09 PROBLEM — I10 ESSENTIAL (PRIMARY) HYPERTENSION: Chronic | Status: ACTIVE | Noted: 2023-01-01

## 2023-11-10 PROBLEM — I11.0 HYPERTENSIVE HEART DISEASE WITH HEART FAILURE: Status: ACTIVE | Noted: 2023-01-01

## 2023-11-19 PROBLEM — F03.90 DEMENTIA WITHOUT BEHAVIORAL DISTURBANCE, PSYCHOTIC DISTURBANCE, MOOD DISTURBANCE, OR ANXIETY, UNSPECIFIED DEMENTIA SEVERITY, UNSPECIFIED DEMENTIA TYPE: Status: ACTIVE | Noted: 2023-01-01

## 2023-12-04 PROBLEM — I35.1 MILD AORTIC REGURGITATION: Status: ACTIVE | Noted: 2023-01-01

## 2023-12-04 PROBLEM — E78.5 HLD (HYPERLIPIDEMIA): Status: ACTIVE | Noted: 2023-01-01

## 2023-12-04 PROBLEM — I10 HTN (HYPERTENSION), BENIGN: Status: ACTIVE | Noted: 2023-01-01

## 2023-12-04 PROBLEM — J96.01 ACUTE RESPIRATORY FAILURE WITH HYPOXIA: Status: ACTIVE | Noted: 2023-01-01

## 2023-12-04 PROBLEM — I05.0 SEVERE MITRAL VALVE STENOSIS: Status: ACTIVE | Noted: 2023-01-01

## 2023-12-04 PROBLEM — Z87.898 HISTORY OF BRADYCARDIA: Status: RESOLVED | Noted: 2023-01-01 | Resolved: 2023-01-01

## 2023-12-04 PROBLEM — R60.0 BILATERAL EDEMA OF LOWER EXTREMITY: Status: ACTIVE | Noted: 2023-01-01

## 2023-12-04 PROBLEM — I27.20 PULMONARY HYPERTENSION: Status: ACTIVE | Noted: 2023-01-01

## 2023-12-04 PROBLEM — H26.9 CATARACTS, BILATERAL: Status: ACTIVE | Noted: 2023-01-01

## 2023-12-04 PROBLEM — Z99.81 ON HOME OXYGEN THERAPY: Status: ACTIVE | Noted: 2023-01-01

## 2023-12-04 PROBLEM — I50.23 ACUTE ON CHRONIC SYSTOLIC CONGESTIVE HEART FAILURE: Status: ACTIVE | Noted: 2023-01-01

## 2023-12-04 NOTE — ED PROVIDER NOTE - OBJECTIVE STATEMENT
89-year-old female with past medical history of dementia, HTN, HLD, bradycardia s/p PPM, CHF presents emergency department for low O2 saturation.  Patient was seen a cardiologist today who noted to have oxygen saturation of 85%.  Patient on home O2 3 L but was placed on 5 L in triage today.  Patient also with increased lower extremity edema over the last few days.  Patient is accompanied by son and grandson who are providing history.  ROS limited but patient denies n/v/d/c. Pt eating and drinking well.

## 2023-12-04 NOTE — ED ADULT NURSE NOTE - OBJECTIVE STATEMENT
89 yo female AAOX1 presents to ED for SOB. As per family at bedside, pt was at cardiologist today with increasing SOB and swelling to b/l MD FLOWER wanted pt to come to ED. Pt uses 2L NC O2 daily, but needed it increased at cardioligist. Grandson at bedside reports that pt seems "weaker" and needed help getting out of car. Pt takes 40mg PO lasix 2x day, +swelling to b/l LE. Denies n/v/d, CP, dizziness, abdominal pain. Safety maintained, bed in low position and side rails up. 89 yo female AAOX1 presents to ED for SOB. As per family at bedside, pt was at cardiologist today with increasing SOB and swelling to b/l MD FLOWER wanted pt to come to ED. Pt uses 2L NC O2 daily, but needed it increased at cardiologist. Grandson at bedside reports that pt seems "weaker" and needed help getting out of car. Pt takes 40mg PO lasix 2x day, +swelling to b/l LE. Denies n/v/d, CP, dizziness, abdominal pain. Safety maintained, bed in low position and side rails up. 89 yo female AAOX1 presents to ED for SOB. As per family at bedside, pt was at cardiologist today with increasing SOB and swelling to b/l MD FLOWER wanted pt to come to ED. Pt uses 2L NC O2 daily, but needed it increased at cardiologist. Grandson at bedside reports that pt seems "weaker" and needed help getting out of car. Pt takes 40mg PO lasix 2x day, +swelling to b/l LE. Denies n/v/d, CP, dizziness, abdominal pain. Safety maintained, bed in low position and side rails up.

## 2023-12-04 NOTE — ED PROVIDER NOTE - PHYSICAL EXAMINATION
Constitutional: VS reviewed. Alert and orientedx1, well appearing, no apparent distress  HEENT: Atraumatic, EOMI, PERRL   CV: RRR  Lungs: Tachypneic. + crackles in all lung fields. Decreased lung sounds in LLL.   Abdomen: Soft, nondistended, nontender  MSK: No deformities  Skin: Warm and dry. As visualized no rashes, lesions, bruising or erythema  Neuro: Strength and sensation intact.   Lymph: 3+ pitting edema of b/l lower extremities up to mid calves

## 2023-12-04 NOTE — ED PROVIDER NOTE - PROGRESS NOTE DETAILS
Kaitlin Valle PGY2: Pt briefly hypotensive after IV Lasix, 80s/40s. BP repeated now 100s/60s. Pt TBA to medicine. Spoke with hospitalist who accepted admission.

## 2023-12-04 NOTE — ED PROVIDER NOTE - CLINICAL SUMMARY MEDICAL DECISION MAKING FREE TEXT BOX
89-year-old female with past medical history of dementia, HTN, HLD, bradycardia s/p PPM, CHF presents emergency department for low O2 saturation at outpatient cards office. Patient had O2 sat in the 80s, normally on 3 L of O2 now on 5 L.  Patient Tachypneic with diffuse crackles and lung fields with decreased breath sounds and left lower lung field.  Patient with 3+ pitting edema in bilateral lower extremities from feet to mid calves.  Plan for labs, EKG, CXR.  Will give IV Lasix.  Patient to be admitted to medicine. 89-year-old female with past medical history of dementia, HTN, HLD, bradycardia s/p PPM, CHF presents emergency department for low O2 saturation at outpatient cards office. Patient had O2 sat in the 80s, normally on 3 L of O2 now on 5 L.  Patient Tachypneic with diffuse crackles and lung fields with decreased breath sounds and left lower lung field.  Patient with 3+ pitting edema in bilateral lower extremities from feet to mid calves.  Plan for labs, EKG, CXR.  Will give IV Lasix.  Patient to be admitted to medicine.    Leena: 89 year old female with pmxh of dementia, htn, hld, bradycardia s/p ppm, CHF presents to the ER with low O2 sat at outpatient cards office. O2 sat in 80s, normally on 3L of O2 now on 5 L.  tachypenic and diffuse crackles in b/l lungs. abdomen soft nt/nd, + 3 pitting edema, no focal deficits, alert andoriented x 3. likely chf exacerbation. will get labs, ekg, cxr, iv lasix, admit to medicine.

## 2023-12-04 NOTE — ED PROVIDER NOTE - CONVERSATION DETAILS
Kaitlin Valle PGY2: Spoke with patient's stepson and grandson.  They state that patient had a MOST form that was filled out during last hospitalization that indicated DNR/DNI status.  Patient currently has home health aide 2 to 3 days a week.  Due to patient's declining ability to ambulate month, they would like to discuss palliative versus hospice home care for patient during hospitalization.

## 2023-12-04 NOTE — ED PROVIDER NOTE - RAPID ASSESSMENT
89 year old female w/ hx of Dementia, HTN, HLD, CAD? bradycardia s/p PPM recent admission for CHF presents from the cardiologists for o2 of 85%  on her last admission last month - She was seen by cardiology  echo on 11/1 showing: LVEF 32%, segmental WMAs. patient taking lasix 40 which she is compliant with . reports discharged on 3L which she has remained on since discharge, per the spouse there has been no increased o2 demand. per family at home o2 saturations have been about 90 % on the 3L (aside from when the cannula falls out in her sleep). over the last several days she has had increasing LE edema. family reports significant generalized weakness. they have not noted her to complain of chest pain or sob. Sent in by Dr. Ramin benz  on my evaluation in [place] patient with o2 saturation 91% on 4L, increased to 6L, mobile nursing notified. 89 year old female w/ hx of Dementia, HTN, HLD, CAD? bradycardia s/p PPM recent admission for CHF presents from the cardiologists for o2 of 85%  on her last admission last month - She was seen by cardiology  echo on 11/1 showing: LVEF 32%, segmental WMAs. patient taking lasix 40 which she is compliant with . reports discharged on 3L which she has remained on since discharge, per the spouse there has been no increased o2 demand. per family at home o2 saturations have been about 90 % on the 3L (aside from when the cannula falls out in her sleep). over the last several days she has had increasing LE edema. family reports significant generalized weakness. they have not noted her to complain of chest pain or sob. Sent in by Dr. Ramin bnez  on my evaluation in [place] patient with o2 saturation 91% on 4L, increased to 6L, mobile nursing notified.    Leidy Guzman MD (Attending): I was available for real-time consultation while the patient was evaluated by the PA/NP in the waiting room as part of a triage screening process but did not directly see or examine the patient. 89 year old female w/ hx of Dementia, HTN, HLD, CAD? bradycardia s/p PPM recent admission for CHF presents from the cardiologists for o2 of 85%  on her last admission last month - She was seen by cardiology  echo on 11/1 showing: LVEF 32%, segmental WMAs. patient taking lasix 40 which she is compliant with . reports discharged on 3L which she has remained on since discharge, per the spouse there has been no increased o2 demand. per family at home o2 saturations have been about 90 % on the 3L (aside from when the cannula falls out in her sleep). over the last several days she has had increasing LE edema. family reports significant generalized weakness. they have not noted her to complain of chest pain or sob. Sent in by Dr. Ramin benz  on my evaluation in [place] patient with o2 saturation 91% on 4L, increased to 6L, mobile nursing notified.    Leidy Guzman MD (Attending): I was available for real-time consultation while the patient was evaluated by the PA/NP in the waiting room as part of a triage screening process but did not directly see or examine the patient.

## 2023-12-04 NOTE — ED ADULT TRIAGE NOTE - CHIEF COMPLAINT QUOTE
went to cardiologist for CHF follow up, normally on 3L o2 but cardiologist states her o2 is still low

## 2023-12-04 NOTE — ED PROVIDER NOTE - CADM POA PRESS ULCER
The history is provided by the patient and a parent. Foot Problem   Location:  Foot  Injury: yes    Foot location:  L foot  Pain details:     Quality:  Aching    Radiates to:  Does not radiate    Severity:  Mild    Onset quality:  Sudden    Timing:  Constant    Progression:  Waxing and waning  Chronicity:  Recurrent  Dislocation: no    Prior injury to area:  Yes  Relieved by:  Nothing  Worsened by:  Bearing weight, exercise, flexion, extension and activity  Ineffective treatments: Ice  Associated symptoms: swelling    Associated symptoms: no back pain and no fever         Review of Systems   Constitutional: Positive for activity change. Negative for chills and fever. HENT: Negative for ear pain, sinus pressure and sore throat. Eyes: Negative for pain, discharge and redness. Respiratory: Negative for cough, shortness of breath and wheezing. Cardiovascular: Negative for chest pain. Gastrointestinal: Negative for abdominal pain, diarrhea, nausea and vomiting. Genitourinary: Negative for dysuria and frequency. Musculoskeletal: Positive for arthralgias. Negative for back pain. Skin: Negative for rash and wound. Neurological: Negative for weakness and headaches. Hematological: Negative for adenopathy. Psychiatric/Behavioral: Negative. All other systems reviewed and are negative. Physical Exam  Vitals signs and nursing note reviewed. Constitutional:       Appearance: He is well-developed. HENT:      Head: Normocephalic and atraumatic. Eyes:      Pupils: Pupils are equal, round, and reactive to light. Neck:      Musculoskeletal: Normal range of motion and neck supple. Cardiovascular:      Rate and Rhythm: Normal rate and regular rhythm. Heart sounds: Normal heart sounds. No murmur. Pulmonary:      Effort: Pulmonary effort is normal.      Breath sounds: Normal breath sounds. Abdominal:      General: Bowel sounds are normal.      Palpations: Abdomen is soft. Tenderness: There is no abdominal tenderness. There is no guarding or rebound. Musculoskeletal:      Left foot: Bony tenderness and swelling present. Feet:    Skin:     General: Skin is warm and dry. Neurological:      Mental Status: He is alert and oriented to person, place, and time. Psychiatric:         Behavior: Behavior normal.         Thought Content: Thought content normal.         Judgment: Judgment normal.          --------------------------------------------- PAST HISTORY ---------------------------------------------  Past Medical History:  has no past medical history on file. Past Surgical History:  has a past surgical history that includes Tonsillectomy and Tympanostomy tube placement. Social History:  reports that he has never smoked. He has never used smokeless tobacco. He reports that he does not drink alcohol or use drugs. Family History: family history is not on file. The patients home medications have been reviewed. Allergies: Patient has no known allergies. -------------------------------------------------- RESULTS -------------------------------------------------  No results found for this visit on 11/16/20. XR FOOT LEFT (MIN 3 VIEWS)   Final Result   No definite radiographically visible acute skeletal pathology. ------------------------- NURSING NOTES AND VITALS REVIEWED ---------------------------   The nursing notes within the ED encounter and vital signs as below have been reviewed. /55   Pulse 55   Temp 98.7 °F (37.1 °C) (Temporal)   Resp 16   Ht 5' 8\" (1.727 m)   Wt 138 lb (62.6 kg)   SpO2 100%   BMI 20.98 kg/m²   Oxygen Saturation Interpretation: Normal    Xr Foot Left (min 3 Views)    Result Date: 11/16/2020  EXAMINATION: THREE XRAY VIEWS OF THE LEFT FOOT 11/16/2020 12:21 pm COMPARISON: None.  HISTORY: ORDERING SYSTEM PROVIDED HISTORY: pain base GRT TOE, landed hard during HS Basketball game; pain x 48 hrs, not improving TECHNOLOGIST PROVIDED HISTORY: Reason for exam:->pain base GRT TOE, landed hard during HS Basketball game; pain x 48 hrs, not improving FINDINGS: There is no radiographic evidence of definite acute fracture or dislocation. No evidence of focal intra-osseous lesion. Joint spaces are maintained. There is no evidence of significant degenerative arthrosis. No definite radiographically visible acute skeletal pathology. ------------------------------------------ PROGRESS NOTES ------------------------------------------   I have spoken with the patient and mother and discussed todays results, in addition to providing specific details for the plan of care and counseling regarding the diagnosis and prognosis. Their questions are answered at this time and they are agreeable with the plan.      --------------------------------- ADDITIONAL PROVIDER NOTES ---------------------------------        This patient is stable for discharge. I have shared the specific conditions for return, as well as the importance of follow-up. IMPRESSION:     1. Contusion of left great toe without damage to nail, initial encounter      There are no discharge medications for this patient.         Procedures     [facility]                  Caio Howe DO  11/16/20 1525 No

## 2023-12-05 NOTE — H&P ADULT - PROBLEM SELECTOR PLAN 3
The patient has a history of hypertension.    - Continue home carvedilol  - Continue home enalapril  - Start IV lasix 20mg BID The patient has a known history of dementia with waxing and waning cognition. She has had a notable decline in her ability to perform her ADLs over the past month per her family.    - PT/OT eval  - Fall/aspiration precautions  - Palliative consult per family's request  - Continue GOC conversation, patient currently DNR/DNI

## 2023-12-05 NOTE — CONSULT NOTE ADULT - ASSESSMENT
The patient is a 89 yo woman with a history of dementia, HTN, HLD, bradycardia s/p PPM, and CHF presenting with hypoxia. On the afternoon of 12/4, the patient arrived to an intake appointment for her cardiologist and was found to have an O2 saturation of 85%, for which she was recommended to report directly to the hospital.  S/p RRT and now on hi flow.  Palliative care called for GOC.

## 2023-12-05 NOTE — CONSULT NOTE ADULT - SUBJECTIVE AND OBJECTIVE BOX
HPI:  The patient is a 89yo woman with a history of dementia, HTN, HLD, bradycardia s/p PPM, and CHF presenting with hypoxia. On the afternoon of 12/4, the patient arrived to an intake appointment for her cardiologist and was found to have an O2 saturation of 85%, for which she was recommended to report directly to the hospital.    The patient had a recent admission to [facility] on 10/31 for hypercapnic respiratory failure 2/2 CHF, after which she was initiated on standing PO lasix, home O2 at 3L, and recommended for outpatient cardiac follow-up. The patient was unable to schedule an appointment until her day of presentation on 12/4. In the interim, the patient was noted to have reduced functional status after returning home. The patient has been experiencing gradual swelling of the lower extremities, with her feet having swollen to the point she can no longer fit in her own footwear. The patient has been unable to lie flat in bed, requiring many pillows to prop her up. The patient has resorted to sleeping in a recliner to maintain an upright position at night. In addition, the patient normally ambulated with a walker throughout her home, but following her recent discharge, the patient has required more assistance with ambulation. Per family, the patient's vision has notably declined over the past month, to the point that the patient cannot readily differentiate objects placed in front of her.    ED Course:  T: Afebrile, HR: 75, BP: 139/77, SpO2 97% 4L NC    Pt denied fevers, chill, N/V/C/D, CP, HA, LOC. No recent travel, no sick contacts. Patient arrived on 4L NC and briefly desaturated to 91%, after which she was increased to 6L and saturated between 94-97%. Oxygen was then titrated back down to 4L and the patient's O2 sat held. Sodium 134, troponin 68, BNP 69518. CXR showed b/l pleural effusions with b/l reticular opacities. The patient received 40mg of IV lasix. Family was contacted for collateral and reaffirmed DNR/DNI status and requested to initiate discussions about palliative/hospice care for the patient. Patient admitted to medicine for management of CHF. (05 Dec 2023 01:56)    PERTINENT PM/SXH:   HTN (hypertension)    HLD (hyperlipidemia)    Dementia    Bradycardia    Chronic HFrEF (heart failure with reduced ejection fraction)      Pacemaker      FAMILY HISTORY: no fhx for CHF    Family Hx substance abuse [ ]yes [ x]no  ITEMS NOT CHECKED ARE NOT PRESENT    SOCIAL HISTORY:   Significant other/partner[x ]  Children[ ]  Oriental orthodox/Spirituality:  Substance hx:  [ ]   Tobacco hx:  [ ]   Alcohol hx: [ ]   Home Opioid hx:  [ ] I-Stop Reference No:  Living Situation: [ x]Home  [ ]Long term care  [ ]Rehab [ ]Other    ADVANCE DIRECTIVES:    DNR/MOLST  [ ]  Living Will  [ ]   DECISION MAKER(s):  [ ] Health Care Proxy(s)  [x ] Surrogate(s)  [ ] Guardian           Name(s): Phone Number(s): Michael [phone]    BASELINE (I)ADL(s) (prior to admission):  [place]: [ ]Total  [x ] Moderate [ ]Dependent    Allergies    No Known Allergies    Intolerances    MEDICATIONS  (STANDING):  aspirin enteric coated 81 milliGRAM(s) Oral daily  carvedilol 12.5 milliGRAM(s) Oral every 12 hours  enoxaparin Injectable 40 milliGRAM(s) SubCutaneous every 24 hours  furosemide   Injectable 40 milliGRAM(s) IV Push two times a day    MEDICATIONS  (PRN):  acetaminophen     Tablet .. 650 milliGRAM(s) Oral every 6 hours PRN Temp greater or equal to 38C (100.4F), Mild Pain (1 - 3)  aluminum hydroxide/magnesium hydroxide/simethicone Suspension 30 milliLiter(s) Oral every 4 hours PRN Dyspepsia  melatonin 3 milliGRAM(s) Oral at bedtime PRN Insomnia  ondansetron Injectable 4 milliGRAM(s) IV Push every 8 hours PRN Nausea and/or Vomiting    PRESENT SYMPTOMS: [x ]Unable to self-report  [ ] CPOT [ ] PAINADs [ ] RDOS  Source if other than patient:  [ ]Family   [ ]Team     Pain: [ ]yes [ ]no  QOL impact -   Location -                    Aggravating factors -  Quality -  Radiation -  Timing-  Severity (0-10 scale):  Minimal acceptable level (0-10 scale):     CPOT:    https://www.[facility].org/getattachment/[email or web address]/Critical-Care-Pain-Observation-Tool-(CPOT)    PAIN AD Score:   http://geriatrictoolkit.[facility]/cog/painad.pdf (press ctrl +  left click to view)    Dyspnea:                           [ ]Mild x[ ]Moderate [ ]Severe      RDOS:  0 to 2  minimal or no respiratory distress   3  mild distress  4 to 6 moderate distress  >7 severe distress  https://homecareinformation.net/handouts/hen/Respiratory_Distress_Observation_Scale.pdf (Ctrl +  left click to view)     Anxiety:                             [ ]Mild [ ]Moderate [ ]Severe  Fatigue:                             [ ]Mild [ ]Moderate [ x]Severe  Nausea:                            [ ]Mild [ ]Moderate [ ]Severe  Loss of appetite:               [ ]Mild [ ]Moderate [ x]Severe  Constipation:                    [ ]Mild [ ]Moderate [ ]Severe    PCSSQ[Palliative Care Spiritual Screening Question]   Severity (0-10):  Score of 4 or > indicate consideration of Chaplaincy referral.  Chaplaincy Referral: [ ] yes [ ] refused [ ] following x[ ] Deferred     Caregiver [place]? : [ ] yes [ ] no [ x] Deferred [ ] Declined             Social work referral [ ] Patient & Family Centered Care Referral [ ]     Anticipatory Grief present?:  [ ] yes [ ] no  [x ] Deferred                  Social work referral [ ] Chaplaincy Referral[ ]      Other Symptoms:  [ ]All other review of systems negative     Palliative Performance Status Version 2:  20       %    http://npcrc.org/files/news/palliative_performance_scale_ppsv2.pdf  PHYSICAL EXAM:  Vital Signs Last 24 Hrs  T(C): 36.4 (05 Dec 2023 06:00), Max: 37.1 (04 Dec 2023 14:20)  T(F): 97.6 (05 Dec 2023 06:00), Max: 98.7 (04 Dec 2023 14:20)  HR: 97 (05 Dec 2023 09:18) (75 - 97)  BP: 135/74 (05 Dec 2023 08:33) (97/57 - 139/77)  BP(mean): 98 (04 Dec 2023 22:57) (70 - 98)  RR: 36 (05 Dec 2023 09:18) (18 - 36)  SpO2: 83% (05 Dec 2023 09:18) (83% - 97%)    Parameters below as of 05 Dec 2023 09:18  Patient On (Oxygen Delivery Method): nasal cannula, high flow  O2 Flow (L/min): 50  O2 Concentration (%): 50 I&O's Summary    GENERAL: [x ]Cachexia    [ ]Alert  [ ]Oriented x   [ x]Lethargic  [ ]Unarousable  [ ]Verbal  [ ]Non-Verbal  Behavioral:   [ ] Anxiety  [x ] Delirium [ ] Agitation [ ] Other  HEENT:  [ ]Normal   [x ]Dry mouth   [ ]ET Tube/Trach  [ ]Oral lesions  PULMONARY:   [ ]Clear [ ]Tachypnea  [ ]Audible excessive secretions   [ ]Rhonchi        [ ]Right [ ]Left [ ]Bilateral  [x ]Crackles        [ ]Right [ ]Left [ ]Bilateral  [ ]Wheezing     [ ]Right [ ]Left [ ]Bilateral  [x ]Diminished breath sounds [ ]right [ ]left [ ]bilateral base  CARDIOVASCULAR:    [x ]Regular [ ]Irregular [ ]Tachy  [ ]Tigre [ ]Murmur [ ]Other  GASTROINTESTINAL:  [x ]Soft  [x ]Distended   [x ]+BS  [ ]Non tender [ ]Tender  [ ]Other [ ]PEG [ ]OGT/ NGT  Last BM:  GENITOURINARY:  [ ]Normal [x ] Incontinent   [ ]Oliguria/Anuria   [ ]Anaya  MUSCULOSKELETAL:   [ ]Normal   [x ]Weakness  [ x]Bed/Wheelchair bound [ ]Edema  NEUROLOGIC:   [ ]No focal deficits  [x]Cognitive impairment  [ ]Dysphagia [ ]Dysarthria [ ]Paresis [ ]Other   SKIN: see rn flow sheet  [x ]Normal  [ ]Rash  [ ]Other  [ ]Pressure ulcer(s)       Present on admission [ ]y [ ]n    CRITICAL CARE:  [ ]Shock Present  [ ]Septic [ ]Cardiogenic [ ]Neurologic [ ]Hypovolemic  [ ]  Vasopressors [ ]  Inotropes   [x ]Respiratory failure present [ ]Mechanical ventilation [ ]Non-invasive ventilatory support [x ]High flow    [ ]Acute  [ ]Chronic [ ]Hypoxic  [ ]Hypercarbic [ ]Other  [ ]Other organ failure     LABS:                        9.8    7.30  )-----------( 211      ( 04 Dec 2023 18:19 )             30.7   12-04    134<L>  |  93<L>  |  18  ----------------------------<  148<H>  3.9   |  31  |  0.88    Ca    9.4      04 Dec 2023 18:19  Phos  3.1     12-04  Mg     2.2     12-04    TPro  6.8  /  Alb  3.4  /  TBili  0.8  /  DBili  x   /  AST  13  /  ALT  14  /  AlkPhos  94  12-04  PT/INR - ( 04 Dec 2023 18:19 )   PT: 11.5 sec;   INR: 1.10 ratio         PTT - ( 04 Dec 2023 18:19 )  PTT:28.5 sec    Urinalysis Basic - ( 04 Dec 2023 18:19 )    Color: x / Appearance: x / SG: x / pH: x  Gluc: 148 mg/dL / Ketone: x  / Bili: x / Urobili: x   Blood: x / Protein: x / Nitrite: x   Leuk Esterase: x / RBC: x / WBC x   Sq Epi: x / Non Sq Epi: x / Bacteria: x      RADIOLOGY & ADDITIONAL STUDIES:    < from: Xray Chest 1 View AP/PA (12.04.23 @ 18:26) >  ACC: [id number] EXAM:  XR CHEST AP OR PA 1V   ORDERED BY:  DHRUV CHERY     PROCEDURE DATE:  12/04/2023          INTERPRETATION:  EXAMINATION: XR CHEST    CLINICAL INDICATION: Shortness of breath    TECHNIQUE: Single frontal, portable view ofthe chest was obtained.    COMPARISON: Chest x-ray 10/31/2023.    FINDINGS:  Left-sided pacemaker.  The heart is not accurately assessed in this AP projection. Partially   calcified aorta. Mitral annular calcifications.  Small right pleural effusion.Moderate layering left pleural effusions.   Bilateral reticular opacities, decreased from prior.  There is no pneumothorax or pleural effusion.  No acute bony abnormality.    IMPRESSION:  Bilateral pleural effusions.  Reticular lung opacities slightly improved   from prior.    --- End of Report ---          EHSAN HANSEN MD; Resident Radiologist  This document has been electronically signed.  ANAY RAMIREZ MD; Attending Radiologist  This document has been electronically signed. Dec  5 2023  9:27AM    < end of copied text >      PROTEIN CALORIE MALNUTRITION PRESENT: [ ]mild [ ]moderate [ ]severe [ ]underweight [ ]morbid obesity  https://www.andeal.org/vault/2440/web/files/ONC/Table_Clinical%20Characteristics%20to%20Document%20Malnutrition-White%20JV%20et%20al%202012.pdf    Height (cm): 147.3 (12-05-23 @ 00:20), 147.3 (10-31-23 @ 18:21)  Weight (kg): 53.3 (12-05-23 @ 00:20), 49.6 (10-31-23 @ 18:21)  BMI (kg/m2): 24.6 (12-05-23 @ 00:20), 22.9 (10-31-23 @ 18:21)    [ ]PPSV2 < or = to 30% [ ]significant weight loss  [ ]poor nutritional intake  [ ]anasarca[ ]Artificial Nutrition      Other REFERRALS:  [ ]Hospice  [ ]Child Life  [ ]Social Work  [ ]Case management [ ]Holistic Therapy     Goals of Care Document:  HPI:  The patient is a 89yo woman with a history of dementia, HTN, HLD, bradycardia s/p PPM, and CHF presenting with hypoxia. On the afternoon of 12/4, the patient arrived to an intake appointment for her cardiologist and was found to have an O2 saturation of 85%, for which she was recommended to report directly to the hospital.    The patient had a recent admission to [facility] on 10/31 for hypercapnic respiratory failure 2/2 CHF, after which she was initiated on standing PO lasix, home O2 at 3L, and recommended for outpatient cardiac follow-up. The patient was unable to schedule an appointment until her day of presentation on 12/4. In the interim, the patient was noted to have reduced functional status after returning home. The patient has been experiencing gradual swelling of the lower extremities, with her feet having swollen to the point she can no longer fit in her own footwear. The patient has been unable to lie flat in bed, requiring many pillows to prop her up. The patient has resorted to sleeping in a recliner to maintain an upright position at night. In addition, the patient normally ambulated with a walker throughout her home, but following her recent discharge, the patient has required more assistance with ambulation. Per family, the patient's vision has notably declined over the past month, to the point that the patient cannot readily differentiate objects placed in front of her.    ED Course:  T: Afebrile, HR: 75, BP: 139/77, SpO2 97% 4L NC    Pt denied fevers, chill, N/V/C/D, CP, HA, LOC. No recent travel, no sick contacts. Patient arrived on 4L NC and briefly desaturated to 91%, after which she was increased to 6L and saturated between 94-97%. Oxygen was then titrated back down to 4L and the patient's O2 sat held. Sodium 134, troponin 68, BNP 28604. CXR showed b/l pleural effusions with b/l reticular opacities. The patient received 40mg of IV lasix. Family was contacted for collateral and reaffirmed DNR/DNI status and requested to initiate discussions about palliative/hospice care for the patient. Patient admitted to medicine for management of CHF. (05 Dec 2023 01:56)    PERTINENT PM/SXH:   HTN (hypertension)    HLD (hyperlipidemia)    Dementia    Bradycardia    Chronic HFrEF (heart failure with reduced ejection fraction)      Pacemaker      FAMILY HISTORY: no fhx for CHF    Family Hx substance abuse [ ]yes [ x]no  ITEMS NOT CHECKED ARE NOT PRESENT    SOCIAL HISTORY:   Significant other/partner[x ]  Children[ ]  Confucianism/Spirituality:  Substance hx:  [ ]   Tobacco hx:  [ ]   Alcohol hx: [ ]   Home Opioid hx:  [ ] I-Stop Reference No:  Living Situation: [ x]Home  [ ]Long term care  [ ]Rehab [ ]Other    ADVANCE DIRECTIVES:    DNR/MOLST  [ ]  Living Will  [ ]   DECISION MAKER(s):  [ ] Health Care Proxy(s)  [x ] Surrogate(s)  [ ] Guardian           Name(s): Phone Number(s): Michael [phone]    BASELINE (I)ADL(s) (prior to admission):  [place]: [ ]Total  [x ] Moderate [ ]Dependent    Allergies    No Known Allergies    Intolerances    MEDICATIONS  (STANDING):  aspirin enteric coated 81 milliGRAM(s) Oral daily  carvedilol 12.5 milliGRAM(s) Oral every 12 hours  enoxaparin Injectable 40 milliGRAM(s) SubCutaneous every 24 hours  furosemide   Injectable 40 milliGRAM(s) IV Push two times a day    MEDICATIONS  (PRN):  acetaminophen     Tablet .. 650 milliGRAM(s) Oral every 6 hours PRN Temp greater or equal to 38C (100.4F), Mild Pain (1 - 3)  aluminum hydroxide/magnesium hydroxide/simethicone Suspension 30 milliLiter(s) Oral every 4 hours PRN Dyspepsia  melatonin 3 milliGRAM(s) Oral at bedtime PRN Insomnia  ondansetron Injectable 4 milliGRAM(s) IV Push every 8 hours PRN Nausea and/or Vomiting    PRESENT SYMPTOMS: [x ]Unable to self-report  [ ] CPOT [ ] PAINADs [ ] RDOS  Source if other than patient:  [ ]Family   [ ]Team     Pain: [ ]yes [ ]no  QOL impact -   Location -                    Aggravating factors -  Quality -  Radiation -  Timing-  Severity (0-10 scale):  Minimal acceptable level (0-10 scale):     CPOT:    https://www.[facility].org/getattachment/[email or web address]/Critical-Care-Pain-Observation-Tool-(CPOT)    PAIN AD Score:   http://geriatrictoolkit.[facility]/cog/painad.pdf (press ctrl +  left click to view)    Dyspnea:                           [ ]Mild x[ ]Moderate [ ]Severe      RDOS:  0 to 2  minimal or no respiratory distress   3  mild distress  4 to 6 moderate distress  >7 severe distress  https://homecareinformation.net/handouts/hen/Respiratory_Distress_Observation_Scale.pdf (Ctrl +  left click to view)     Anxiety:                             [ ]Mild [ ]Moderate [ ]Severe  Fatigue:                             [ ]Mild [ ]Moderate [ x]Severe  Nausea:                            [ ]Mild [ ]Moderate [ ]Severe  Loss of appetite:               [ ]Mild [ ]Moderate [ x]Severe  Constipation:                    [ ]Mild [ ]Moderate [ ]Severe    PCSSQ[Palliative Care Spiritual Screening Question]   Severity (0-10):  Score of 4 or > indicate consideration of Chaplaincy referral.  Chaplaincy Referral: [ ] yes [ ] refused [ ] following x[ ] Deferred     Caregiver [place]? : [ ] yes [ ] no [ x] Deferred [ ] Declined             Social work referral [ ] Patient & Family Centered Care Referral [ ]     Anticipatory Grief present?:  [ ] yes [ ] no  [x ] Deferred                  Social work referral [ ] Chaplaincy Referral[ ]      Other Symptoms:  [ ]All other review of systems negative     Palliative Performance Status Version 2:  20       %    http://npcrc.org/files/news/palliative_performance_scale_ppsv2.pdf  PHYSICAL EXAM:  Vital Signs Last 24 Hrs  T(C): 36.4 (05 Dec 2023 06:00), Max: 37.1 (04 Dec 2023 14:20)  T(F): 97.6 (05 Dec 2023 06:00), Max: 98.7 (04 Dec 2023 14:20)  HR: 97 (05 Dec 2023 09:18) (75 - 97)  BP: 135/74 (05 Dec 2023 08:33) (97/57 - 139/77)  BP(mean): 98 (04 Dec 2023 22:57) (70 - 98)  RR: 36 (05 Dec 2023 09:18) (18 - 36)  SpO2: 83% (05 Dec 2023 09:18) (83% - 97%)    Parameters below as of 05 Dec 2023 09:18  Patient On (Oxygen Delivery Method): nasal cannula, high flow  O2 Flow (L/min): 50  O2 Concentration (%): 50 I&O's Summary    GENERAL: [x ]Cachexia    [ ]Alert  [ ]Oriented x   [ x]Lethargic  [ ]Unarousable  [ ]Verbal  [ ]Non-Verbal  Behavioral:   [ ] Anxiety  [x ] Delirium [ ] Agitation [ ] Other  HEENT:  [ ]Normal   [x ]Dry mouth   [ ]ET Tube/Trach  [ ]Oral lesions  PULMONARY:   [ ]Clear [ ]Tachypnea  [ ]Audible excessive secretions   [ ]Rhonchi        [ ]Right [ ]Left [ ]Bilateral  [x ]Crackles        [ ]Right [ ]Left [ ]Bilateral  [ ]Wheezing     [ ]Right [ ]Left [ ]Bilateral  [x ]Diminished breath sounds [ ]right [ ]left [ ]bilateral base  CARDIOVASCULAR:    [x ]Regular [ ]Irregular [ ]Tachy  [ ]Tigre [ ]Murmur [ ]Other  GASTROINTESTINAL:  [x ]Soft  [x ]Distended   [x ]+BS  [ ]Non tender [ ]Tender  [ ]Other [ ]PEG [ ]OGT/ NGT  Last BM:  GENITOURINARY:  [ ]Normal [x ] Incontinent   [ ]Oliguria/Anuria   [ ]Anaya  MUSCULOSKELETAL:   [ ]Normal   [x ]Weakness  [ x]Bed/Wheelchair bound [ ]Edema  NEUROLOGIC:   [ ]No focal deficits  [x]Cognitive impairment  [ ]Dysphagia [ ]Dysarthria [ ]Paresis [ ]Other   SKIN: see rn flow sheet  [x ]Normal  [ ]Rash  [ ]Other  [ ]Pressure ulcer(s)       Present on admission [ ]y [ ]n    CRITICAL CARE:  [ ]Shock Present  [ ]Septic [ ]Cardiogenic [ ]Neurologic [ ]Hypovolemic  [ ]  Vasopressors [ ]  Inotropes   [x ]Respiratory failure present [ ]Mechanical ventilation [ ]Non-invasive ventilatory support [x ]High flow    [ ]Acute  [ ]Chronic [ ]Hypoxic  [ ]Hypercarbic [ ]Other  [ ]Other organ failure     LABS:                        9.8    7.30  )-----------( 211      ( 04 Dec 2023 18:19 )             30.7   12-04    134<L>  |  93<L>  |  18  ----------------------------<  148<H>  3.9   |  31  |  0.88    Ca    9.4      04 Dec 2023 18:19  Phos  3.1     12-04  Mg     2.2     12-04    TPro  6.8  /  Alb  3.4  /  TBili  0.8  /  DBili  x   /  AST  13  /  ALT  14  /  AlkPhos  94  12-04  PT/INR - ( 04 Dec 2023 18:19 )   PT: 11.5 sec;   INR: 1.10 ratio         PTT - ( 04 Dec 2023 18:19 )  PTT:28.5 sec    Urinalysis Basic - ( 04 Dec 2023 18:19 )    Color: x / Appearance: x / SG: x / pH: x  Gluc: 148 mg/dL / Ketone: x  / Bili: x / Urobili: x   Blood: x / Protein: x / Nitrite: x   Leuk Esterase: x / RBC: x / WBC x   Sq Epi: x / Non Sq Epi: x / Bacteria: x      RADIOLOGY & ADDITIONAL STUDIES:    < from: Xray Chest 1 View AP/PA (12.04.23 @ 18:26) >  ACC: [id number] EXAM:  XR CHEST AP OR PA 1V   ORDERED BY:  DHRUV CHERY     PROCEDURE DATE:  12/04/2023          INTERPRETATION:  EXAMINATION: XR CHEST    CLINICAL INDICATION: Shortness of breath    TECHNIQUE: Single frontal, portable view ofthe chest was obtained.    COMPARISON: Chest x-ray 10/31/2023.    FINDINGS:  Left-sided pacemaker.  The heart is not accurately assessed in this AP projection. Partially   calcified aorta. Mitral annular calcifications.  Small right pleural effusion.Moderate layering left pleural effusions.   Bilateral reticular opacities, decreased from prior.  There is no pneumothorax or pleural effusion.  No acute bony abnormality.    IMPRESSION:  Bilateral pleural effusions.  Reticular lung opacities slightly improved   from prior.    --- End of Report ---          EHSAN HANSEN MD; Resident Radiologist  This document has been electronically signed.  ANAY RAMIREZ MD; Attending Radiologist  This document has been electronically signed. Dec  5 2023  9:27AM    < end of copied text >      PROTEIN CALORIE MALNUTRITION PRESENT: [ ]mild [ ]moderate [ ]severe [ ]underweight [ ]morbid obesity  https://www.andeal.org/vault/2440/web/files/ONC/Table_Clinical%20Characteristics%20to%20Document%20Malnutrition-White%20JV%20et%20al%202012.pdf    Height (cm): 147.3 (12-05-23 @ 00:20), 147.3 (10-31-23 @ 18:21)  Weight (kg): 53.3 (12-05-23 @ 00:20), 49.6 (10-31-23 @ 18:21)  BMI (kg/m2): 24.6 (12-05-23 @ 00:20), 22.9 (10-31-23 @ 18:21)    [ ]PPSV2 < or = to 30% [ ]significant weight loss  [ ]poor nutritional intake  [ ]anasarca[ ]Artificial Nutrition      Other REFERRALS:  [ ]Hospice  [ ]Child Life  [ ]Social Work  [ ]Case management [ ]Holistic Therapy     Goals of Care Document:  HPI:  The patient is a 89yo woman with a history of dementia, HTN, HLD, bradycardia s/p PPM, and CHF presenting with hypoxia. On the afternoon of 12/4, the patient arrived to an intake appointment for her cardiologist and was found to have an O2 saturation of 85%, for which she was recommended to report directly to the hospital.    The patient had a recent admission to [facility] on 10/31 for hypercapnic respiratory failure 2/2 CHF, after which she was initiated on standing PO lasix, home O2 at 3L, and recommended for outpatient cardiac follow-up. The patient was unable to schedule an appointment until her day of presentation on 12/4. In the interim, the patient was noted to have reduced functional status after returning home. The patient has been experiencing gradual swelling of the lower extremities, with her feet having swollen to the point she can no longer fit in her own footwear. The patient has been unable to lie flat in bed, requiring many pillows to prop her up. The patient has resorted to sleeping in a recliner to maintain an upright position at night. In addition, the patient normally ambulated with a walker throughout her home, but following her recent discharge, the patient has required more assistance with ambulation. Per family, the patient's vision has notably declined over the past month, to the point that the patient cannot readily differentiate objects placed in front of her.    ED Course:  T: Afebrile, HR: 75, BP: 139/77, SpO2 97% 4L NC    Pt denied fevers, chill, N/V/C/D, CP, HA, LOC. No recent travel, no sick contacts. Patient arrived on 4L NC and briefly desaturated to 91%, after which she was increased to 6L and saturated between 94-97%. Oxygen was then titrated back down to 4L and the patient's O2 sat held. Sodium 134, troponin 68, BNP 34524. CXR showed b/l pleural effusions with b/l reticular opacities. The patient received 40mg of IV lasix. Family was contacted for collateral and reaffirmed DNR/DNI status and requested to initiate discussions about palliative/hospice care for the patient. Patient admitted to medicine for management of CHF. (05 Dec 2023 01:56)    PERTINENT PM/SXH:   HTN (hypertension)    HLD (hyperlipidemia)    Dementia    Bradycardia    Chronic HFrEF (heart failure with reduced ejection fraction)      Pacemaker      FAMILY HISTORY: no fhx for CHF    Family Hx substance abuse [ ]yes [ x]no  ITEMS NOT CHECKED ARE NOT PRESENT    SOCIAL HISTORY:   Significant other/partner[x ]  Children[ ]  Buddhism/Spirituality:  Substance hx:  [ ]   Tobacco hx:  [ ]   Alcohol hx: [ ]   Home Opioid hx:  [ ] I-Stop Reference No:  Living Situation: [ x]Home  [ ]Long term care  [ ]Rehab [ ]Other    ADVANCE DIRECTIVES:    DNR/MOLST  [ ]  Living Will  [ ]   DECISION MAKER(s):  [ ] Health Care Proxy(s)  [x ] Surrogate(s)  [ ] Guardian           Name(s): Phone Number(s): Michael [phone]    BASELINE (I)ADL(s) (prior to admission):  [place]: [ ]Total  [x ] Moderate [ ]Dependent    Allergies    No Known Allergies    Intolerances    MEDICATIONS  (STANDING):  aspirin enteric coated 81 milliGRAM(s) Oral daily  carvedilol 12.5 milliGRAM(s) Oral every 12 hours  enoxaparin Injectable 40 milliGRAM(s) SubCutaneous every 24 hours  furosemide   Injectable 40 milliGRAM(s) IV Push two times a day    MEDICATIONS  (PRN):  acetaminophen     Tablet .. 650 milliGRAM(s) Oral every 6 hours PRN Temp greater or equal to 38C (100.4F), Mild Pain (1 - 3)  aluminum hydroxide/magnesium hydroxide/simethicone Suspension 30 milliLiter(s) Oral every 4 hours PRN Dyspepsia  melatonin 3 milliGRAM(s) Oral at bedtime PRN Insomnia  ondansetron Injectable 4 milliGRAM(s) IV Push every 8 hours PRN Nausea and/or Vomiting    PRESENT SYMPTOMS: [x ]Unable to self-report  [ ] CPOT [ ] PAINADs [ ] RDOS  Source if other than patient:  [ ]Family   [ ]Team     Pain: [ ]yes [ ]no  QOL impact -   Location -                    Aggravating factors -  Quality -  Radiation -  Timing-  Severity (0-10 scale):  Minimal acceptable level (0-10 scale):     CPOT:    https://www.[facility].org/getattachment/[email or web address]/Critical-Care-Pain-Observation-Tool-(CPOT)    PAIN AD Score:   http://geriatrictoolkit.[facility]/cog/painad.pdf (press ctrl +  left click to view)    Dyspnea:                           [ ]Mild x[ ]Moderate [ ]Severe      RDOS:  0 to 2  minimal or no respiratory distress   3  mild distress  4 to 6 moderate distress  >7 severe distress  https://homecareinformation.net/handouts/hen/Respiratory_Distress_Observation_Scale.pdf (Ctrl +  left click to view)     Anxiety:                             [ ]Mild [ ]Moderate [ ]Severe  Fatigue:                             [ ]Mild [ ]Moderate [ x]Severe  Nausea:                            [ ]Mild [ ]Moderate [ ]Severe  Loss of appetite:               [ ]Mild [ ]Moderate [ x]Severe  Constipation:                    [ ]Mild [ ]Moderate [ ]Severe    PCSSQ[Palliative Care Spiritual Screening Question]   Severity (0-10):  Score of 4 or > indicate consideration of Chaplaincy referral.  Chaplaincy Referral: [ ] yes [ ] refused [ ] following x[ ] Deferred     Caregiver Anthony? : [ ] yes [ ] no [ x] Deferred [ ] Declined             Social work referral [ ] Patient & Family Centered Care Referral [ ]     Anticipatory Grief present?:  [ ] yes [ ] no  [x ] Deferred                  Social work referral [ ] Chaplaincy Referral[ ]      Other Symptoms:  [ ]All other review of systems negative     Palliative Performance Status Version 2:  20       %    http://npcrc.org/files/news/palliative_performance_scale_ppsv2.pdf  PHYSICAL EXAM:  Vital Signs Last 24 Hrs  T(C): 36.4 (05 Dec 2023 06:00), Max: 37.1 (04 Dec 2023 14:20)  T(F): 97.6 (05 Dec 2023 06:00), Max: 98.7 (04 Dec 2023 14:20)  HR: 97 (05 Dec 2023 09:18) (75 - 97)  BP: 135/74 (05 Dec 2023 08:33) (97/57 - 139/77)  BP(mean): 98 (04 Dec 2023 22:57) (70 - 98)  RR: 36 (05 Dec 2023 09:18) (18 - 36)  SpO2: 83% (05 Dec 2023 09:18) (83% - 97%)    Parameters below as of 05 Dec 2023 09:18  Patient On (Oxygen Delivery Method): nasal cannula, high flow  O2 Flow (L/min): 50  O2 Concentration (%): 50 I&O's Summary    GENERAL: [x ]Cachexia    [ ]Alert  [ ]Oriented x   [ x]Lethargic  [ ]Unarousable  [ ]Verbal  [ ]Non-Verbal  Behavioral:   [ ] Anxiety  [x ] Delirium [ ] Agitation [ ] Other  HEENT:  [ ]Normal   [x ]Dry mouth   [ ]ET Tube/Trach  [ ]Oral lesions  PULMONARY: using accessory muscles to breath  [ ]Clear [ x]Tachypnea  [ ]Audible excessive secretions   [ ]Rhonchi        [ ]Right [ ]Left [ ]Bilateral  [x ]Crackles        [ ]Right [ ]Left [ ]Bilateral  [ ]Wheezing     [ ]Right [ ]Left [ ]Bilateral  [x ]Diminished breath sounds [ ]right [ ]left [ ]bilateral base  CARDIOVASCULAR:    [x ]Regular [ ]Irregular [ ]Tachy  [ ]Tigre [ ]Murmur [ ]Other  GASTROINTESTINAL:  [x ]Soft  [x ]Distended   [x ]+BS  [ ]Non tender [ ]Tender  [ ]Other [ ]PEG [ ]OGT/ NGT  Last BM:  GENITOURINARY:  [ ]Normal [x ] Incontinent   [ ]Oliguria/Anuria   [ ]Anaya  MUSCULOSKELETAL:   [ ]Normal   [x ]Weakness  [ x]Bed/Wheelchair bound [ ]Edema  NEUROLOGIC:   [ ]No focal deficits  [x]Cognitive impairment  [ ]Dysphagia [ ]Dysarthria [ ]Paresis [ ]Other   SKIN: see rn flow sheet  [x ]Normal  [ ]Rash  [ ]Other  [ ]Pressure ulcer(s)       Present on admission [ ]y [ ]n    CRITICAL CARE:  [ ]Shock Present  [ ]Septic [ ]Cardiogenic [ ]Neurologic [ ]Hypovolemic  [ ]  Vasopressors [ ]  Inotropes   [x ]Respiratory failure present [ ]Mechanical ventilation [ ]Non-invasive ventilatory support [x ]High flow    [ ]Acute  [ ]Chronic [ ]Hypoxic  [ ]Hypercarbic [ ]Other  [ ]Other organ failure     LABS:                        9.8    7.30  )-----------( 211      ( 04 Dec 2023 18:19 )             30.7   12-04    134<L>  |  93<L>  |  18  ----------------------------<  148<H>  3.9   |  31  |  0.88    Ca    9.4      04 Dec 2023 18:19  Phos  3.1     12-04  Mg     2.2     12-04    TPro  6.8  /  Alb  3.4  /  TBili  0.8  /  DBili  x   /  AST  13  /  ALT  14  /  AlkPhos  94  12-04  PT/INR - ( 04 Dec 2023 18:19 )   PT: 11.5 sec;   INR: 1.10 ratio         PTT - ( 04 Dec 2023 18:19 )  PTT:28.5 sec    Urinalysis Basic - ( 04 Dec 2023 18:19 )    Color: x / Appearance: x / SG: x / pH: x  Gluc: 148 mg/dL / Ketone: x  / Bili: x / Urobili: x   Blood: x / Protein: x / Nitrite: x   Leuk Esterase: x / RBC: x / WBC x   Sq Epi: x / Non Sq Epi: x / Bacteria: x      RADIOLOGY & ADDITIONAL STUDIES:    < from: Xray Chest 1 View AP/PA (12.04.23 @ 18:26) >  ACC: [id number] EXAM:  XR CHEST AP OR PA 1V   ORDERED BY:  DHRUV CHERY     PROCEDURE DATE:  12/04/2023          INTERPRETATION:  EXAMINATION: XR CHEST    CLINICAL INDICATION: Shortness of breath    TECHNIQUE: Single frontal, portable view ofthe chest was obtained.    COMPARISON: Chest x-ray 10/31/2023.    FINDINGS:  Left-sided pacemaker.  The heart is not accurately assessed in this AP projection. Partially   calcified aorta. Mitral annular calcifications.  Small right pleural effusion.Moderate layering left pleural effusions.   Bilateral reticular opacities, decreased from prior.  There is no pneumothorax or pleural effusion.  No acute bony abnormality.    IMPRESSION:  Bilateral pleural effusions.  Reticular lung opacities slightly improved   from prior.    --- End of Report ---          EHSAN HANSEN MD; Resident Radiologist  This document has been electronically signed.  ANAY RAMIREZ MD; Attending Radiologist  This document has been electronically signed. Dec  5 2023  9:27AM    < end of copied text >      PROTEIN CALORIE MALNUTRITION PRESENT: [ ]mild [ ]moderate [ ]severe [ ]underweight [ ]morbid obesity  https://www.andeal.org/vault/6030/web/files/ONC/Table_Clinical%20Characteristics%20to%20Document%20Malnutrition-White%20JV%20et%20al%202012.pdf    Height (cm): 147.3 (12-05-23 @ 00:20), 147.3 (10-31-23 @ 18:21)  Weight (kg): 53.3 (12-05-23 @ 00:20), 49.6 (10-31-23 @ 18:21)  BMI (kg/m2): 24.6 (12-05-23 @ 00:20), 22.9 (10-31-23 @ 18:21)    [ ]PPSV2 < or = to 30% [ ]significant weight loss  [ ]poor nutritional intake  [ ]anasarca[ ]Artificial Nutrition      Other REFERRALS:  [ ]Hospice  [ ]Child Life  [ ]Social Work  [ ]Case management [ ]Holistic Therapy     Goals of Care Document:  HPI:  The patient is a 89yo woman with a history of dementia, HTN, HLD, bradycardia s/p PPM, and CHF presenting with hypoxia. On the afternoon of 12/4, the patient arrived to an intake appointment for her cardiologist and was found to have an O2 saturation of 85%, for which she was recommended to report directly to the hospital.    The patient had a recent admission to [facility] on 10/31 for hypercapnic respiratory failure 2/2 CHF, after which she was initiated on standing PO lasix, home O2 at 3L, and recommended for outpatient cardiac follow-up. The patient was unable to schedule an appointment until her day of presentation on 12/4. In the interim, the patient was noted to have reduced functional status after returning home. The patient has been experiencing gradual swelling of the lower extremities, with her feet having swollen to the point she can no longer fit in her own footwear. The patient has been unable to lie flat in bed, requiring many pillows to prop her up. The patient has resorted to sleeping in a recliner to maintain an upright position at night. In addition, the patient normally ambulated with a walker throughout her home, but following her recent discharge, the patient has required more assistance with ambulation. Per family, the patient's vision has notably declined over the past month, to the point that the patient cannot readily differentiate objects placed in front of her.    ED Course:  T: Afebrile, HR: 75, BP: 139/77, SpO2 97% 4L NC    Pt denied fevers, chill, N/V/C/D, CP, HA, LOC. No recent travel, no sick contacts. Patient arrived on 4L NC and briefly desaturated to 91%, after which she was increased to 6L and saturated between 94-97%. Oxygen was then titrated back down to 4L and the patient's O2 sat held. Sodium 134, troponin 68, BNP 03402. CXR showed b/l pleural effusions with b/l reticular opacities. The patient received 40mg of IV lasix. Family was contacted for collateral and reaffirmed DNR/DNI status and requested to initiate discussions about palliative/hospice care for the patient. Patient admitted to medicine for management of CHF. (05 Dec 2023 01:56)    PERTINENT PM/SXH:   HTN (hypertension)    HLD (hyperlipidemia)    Dementia    Bradycardia    Chronic HFrEF (heart failure with reduced ejection fraction)      Pacemaker      FAMILY HISTORY: no fhx for CHF    Family Hx substance abuse [ ]yes [ x]no  ITEMS NOT CHECKED ARE NOT PRESENT    SOCIAL HISTORY:   Significant other/partner[x ]  Children[ ]  Taoism/Spirituality:  Substance hx:  [ ]   Tobacco hx:  [ ]   Alcohol hx: [ ]   Home Opioid hx:  [ ] I-Stop Reference No:  Living Situation: [ x]Home  [ ]Long term care  [ ]Rehab [ ]Other    ADVANCE DIRECTIVES:    DNR/MOLST  [ ]  Living Will  [ ]   DECISION MAKER(s):  [ ] Health Care Proxy(s)  [x ] Surrogate(s)  [ ] Guardian           Name(s): Phone Number(s): Michael [phone]    BASELINE (I)ADL(s) (prior to admission):  [place]: [ ]Total  [x ] Moderate [ ]Dependent    Allergies    No Known Allergies    Intolerances    MEDICATIONS  (STANDING):  aspirin enteric coated 81 milliGRAM(s) Oral daily  carvedilol 12.5 milliGRAM(s) Oral every 12 hours  enoxaparin Injectable 40 milliGRAM(s) SubCutaneous every 24 hours  furosemide   Injectable 40 milliGRAM(s) IV Push two times a day    MEDICATIONS  (PRN):  acetaminophen     Tablet .. 650 milliGRAM(s) Oral every 6 hours PRN Temp greater or equal to 38C (100.4F), Mild Pain (1 - 3)  aluminum hydroxide/magnesium hydroxide/simethicone Suspension 30 milliLiter(s) Oral every 4 hours PRN Dyspepsia  melatonin 3 milliGRAM(s) Oral at bedtime PRN Insomnia  ondansetron Injectable 4 milliGRAM(s) IV Push every 8 hours PRN Nausea and/or Vomiting    PRESENT SYMPTOMS: [x ]Unable to self-report  [ ] CPOT [ ] PAINADs [ ] RDOS  Source if other than patient:  [ ]Family   [ ]Team     Pain: [ ]yes [ ]no  QOL impact -   Location -                    Aggravating factors -  Quality -  Radiation -  Timing-  Severity (0-10 scale):  Minimal acceptable level (0-10 scale):     CPOT:    https://www.[facility].org/getattachment/[email or web address]/Critical-Care-Pain-Observation-Tool-(CPOT)    PAIN AD Score:   http://geriatrictoolkit.[facility]/cog/painad.pdf (press ctrl +  left click to view)    Dyspnea:                           [ ]Mild x[ ]Moderate [ ]Severe      RDOS:  0 to 2  minimal or no respiratory distress   3  mild distress  4 to 6 moderate distress  >7 severe distress  https://homecareinformation.net/handouts/hen/Respiratory_Distress_Observation_Scale.pdf (Ctrl +  left click to view)     Anxiety:                             [ ]Mild [ ]Moderate [ ]Severe  Fatigue:                             [ ]Mild [ ]Moderate [ x]Severe  Nausea:                            [ ]Mild [ ]Moderate [ ]Severe  Loss of appetite:               [ ]Mild [ ]Moderate [ x]Severe  Constipation:                    [ ]Mild [ ]Moderate [ ]Severe    PCSSQ[Palliative Care Spiritual Screening Question]   Severity (0-10):  Score of 4 or > indicate consideration of Chaplaincy referral.  Chaplaincy Referral: [ ] yes [ ] refused [ ] following x[ ] Deferred     Caregiver [place]? : [ ] yes [ ] no [ x] Deferred [ ] Declined             Social work referral [ ] Patient & Family Centered Care Referral [ ]     Anticipatory Grief present?:  [ ] yes [ ] no  [x ] Deferred                  Social work referral [ ] Chaplaincy Referral[ ]      Other Symptoms:  [ ]All other review of systems negative     Palliative Performance Status Version 2:  20       %    http://npcrc.org/files/news/palliative_performance_scale_ppsv2.pdf  PHYSICAL EXAM:  Vital Signs Last 24 Hrs  T(C): 36.4 (05 Dec 2023 06:00), Max: 37.1 (04 Dec 2023 14:20)  T(F): 97.6 (05 Dec 2023 06:00), Max: 98.7 (04 Dec 2023 14:20)  HR: 97 (05 Dec 2023 09:18) (75 - 97)  BP: 135/74 (05 Dec 2023 08:33) (97/57 - 139/77)  BP(mean): 98 (04 Dec 2023 22:57) (70 - 98)  RR: 36 (05 Dec 2023 09:18) (18 - 36)  SpO2: 83% (05 Dec 2023 09:18) (83% - 97%)    Parameters below as of 05 Dec 2023 09:18  Patient On (Oxygen Delivery Method): nasal cannula, high flow  O2 Flow (L/min): 50  O2 Concentration (%): 50 I&O's Summary    GENERAL: [x ]Cachexia    [ ]Alert  [ ]Oriented x   [ x]Lethargic  [ ]Unarousable  [ ]Verbal  [ ]Non-Verbal  Behavioral:   [ ] Anxiety  [x ] Delirium [ ] Agitation [ ] Other  HEENT:  [ ]Normal   [x ]Dry mouth   [ ]ET Tube/Trach  [ ]Oral lesions  PULMONARY: using accessory muscles to breath  [ ]Clear [ x]Tachypnea  [ ]Audible excessive secretions   [ ]Rhonchi        [ ]Right [ ]Left [ ]Bilateral  [x ]Crackles        [ ]Right [ ]Left [ ]Bilateral  [ ]Wheezing     [ ]Right [ ]Left [ ]Bilateral  [x ]Diminished breath sounds [ ]right [ ]left [ ]bilateral base  CARDIOVASCULAR:    [x ]Regular [ ]Irregular [ ]Tachy  [ ]Tigre [ ]Murmur [ ]Other  GASTROINTESTINAL:  [x ]Soft  [x ]Distended   [x ]+BS  [ ]Non tender [ ]Tender  [ ]Other [ ]PEG [ ]OGT/ NGT  Last BM:  GENITOURINARY:  [ ]Normal [x ] Incontinent   [ ]Oliguria/Anuria   [ ]Anaya  MUSCULOSKELETAL:   [ ]Normal   [x ]Weakness  [ x]Bed/Wheelchair bound [ ]Edema  NEUROLOGIC:   [ ]No focal deficits  [x]Cognitive impairment  [ ]Dysphagia [ ]Dysarthria [ ]Paresis [ ]Other   SKIN: see rn flow sheet  [x ]Normal  [ ]Rash  [ ]Other  [ ]Pressure ulcer(s)       Present on admission [ ]y [ ]n    CRITICAL CARE:  [ ]Shock Present  [ ]Septic [ ]Cardiogenic [ ]Neurologic [ ]Hypovolemic  [ ]  Vasopressors [ ]  Inotropes   [x ]Respiratory failure present [ ]Mechanical ventilation [ ]Non-invasive ventilatory support [x ]High flow    [ ]Acute  [ ]Chronic [ ]Hypoxic  [ ]Hypercarbic [ ]Other  [ ]Other organ failure     LABS:                        9.8    7.30  )-----------( 211      ( 04 Dec 2023 18:19 )             30.7   12-04    134<L>  |  93<L>  |  18  ----------------------------<  148<H>  3.9   |  31  |  0.88    Ca    9.4      04 Dec 2023 18:19  Phos  3.1     12-04  Mg     2.2     12-04    TPro  6.8  /  Alb  3.4  /  TBili  0.8  /  DBili  x   /  AST  13  /  ALT  14  /  AlkPhos  94  12-04  PT/INR - ( 04 Dec 2023 18:19 )   PT: 11.5 sec;   INR: 1.10 ratio         PTT - ( 04 Dec 2023 18:19 )  PTT:28.5 sec    Urinalysis Basic - ( 04 Dec 2023 18:19 )    Color: x / Appearance: x / SG: x / pH: x  Gluc: 148 mg/dL / Ketone: x  / Bili: x / Urobili: x   Blood: x / Protein: x / Nitrite: x   Leuk Esterase: x / RBC: x / WBC x   Sq Epi: x / Non Sq Epi: x / Bacteria: x      RADIOLOGY & ADDITIONAL STUDIES:    < from: Xray Chest 1 View AP/PA (12.04.23 @ 18:26) >  ACC: [id number] EXAM:  XR CHEST AP OR PA 1V   ORDERED BY:  DHRUV CHERY     PROCEDURE DATE:  12/04/2023          INTERPRETATION:  EXAMINATION: XR CHEST    CLINICAL INDICATION: Shortness of breath    TECHNIQUE: Single frontal, portable view ofthe chest was obtained.    COMPARISON: Chest x-ray 10/31/2023.    FINDINGS:  Left-sided pacemaker.  The heart is not accurately assessed in this AP projection. Partially   calcified aorta. Mitral annular calcifications.  Small right pleural effusion.Moderate layering left pleural effusions.   Bilateral reticular opacities, decreased from prior.  There is no pneumothorax or pleural effusion.  No acute bony abnormality.    IMPRESSION:  Bilateral pleural effusions.  Reticular lung opacities slightly improved   from prior.    --- End of Report ---          EHSAN HANSEN MD; Resident Radiologist  This document has been electronically signed.  ANAY RAMIREZ MD; Attending Radiologist  This document has been electronically signed. Dec  5 2023  9:27AM    < end of copied text >      PROTEIN CALORIE MALNUTRITION PRESENT: [ ]mild [ ]moderate [ ]severe [ ]underweight [ ]morbid obesity  https://www.andeal.org/vault/1630/web/files/ONC/Table_Clinical%20Characteristics%20to%20Document%20Malnutrition-White%20JV%20et%20al%202012.pdf    Height (cm): 147.3 (12-05-23 @ 00:20), 147.3 (10-31-23 @ 18:21)  Weight (kg): 53.3 (12-05-23 @ 00:20), 49.6 (10-31-23 @ 18:21)  BMI (kg/m2): 24.6 (12-05-23 @ 00:20), 22.9 (10-31-23 @ 18:21)    [ ]PPSV2 < or = to 30% [ ]significant weight loss  [ ]poor nutritional intake  [ ]anasarca[ ]Artificial Nutrition      Other REFERRALS:  [ ]Hospice  [ ]Child Life  [ ]Social Work  [ ]Case management [ ]Holistic Therapy     Goals of Care Document:

## 2023-12-05 NOTE — H&P ADULT - PROBLEM SELECTOR PLAN 7
Activity: Fall risk, PT consult  Consultants: Palliative  DVT ppx: Lovenox  Diet: Regular  Dispo: Floors  Directive: DNR/DNI  Electrolytes: Replete as necessary  Precautions: Fall, Aspiration The patient has history of hyperlipidemia, currently not receiving medications.    - Lipid profile in AM  - Encourage initiation of statin in outpatient setting

## 2023-12-05 NOTE — PROGRESS NOTE ADULT - PROBLEM SELECTOR PLAN 2
The patient presenting in respiratory failure with hypoxia from her outpatient medical visit. Patient O2 sat was at 85% on 3L NC and has required increased O2 requirements during hospitalization. Hypoxia is likely secondary to pulmonary edema from acute heart failure.    - Treat heart failure as above  - monitor respiratory status, currently on HFNC  - LE doppler ordered

## 2023-12-05 NOTE — PROGRESS NOTE ADULT - PROBLEM SELECTOR PLAN 1
The patient was recently diagnosed with HFrEF in November. Latest EF at 32%. Patient family states she is adherence to outpatient regimen. The patient has begun to experience increasing LE edema over the past month with hypoxia down to 85% on 3L NC. CXR showed b/l pleural effusions and b/l reticular opacities. The patient's hypoxia is likely secondary to her underlying heart failure.    - Continue home carvedilol  - Hold home enalapril i/s/o hypotension   - on IV lasix 40mg BID  - currently on HFNC  - TTE ordered  - Standing daily weights  - Strict I/Os  - GOCs discussion held with family- will continue to medically tx but also focus on pt's comfort

## 2023-12-05 NOTE — PHYSICAL THERAPY INITIAL EVALUATION ADULT - PERTINENT HX OF CURRENT PROBLEM, REHAB EVAL
89yo woman with a history of dementia, HTN, HLD, bradycardia s/p PPM, and CHF presenting with hypoxia. On the afternoon of 12/4, the patient arrived to an intake appointment for her cardiologist and was found to have an O2 saturation of 85%, for which she was recommended to report directly to the hospital.  Pt with recent admission to [facility] on 10/31 for hypercapnic respiratory failure 2/2 CHF, after which she was initiated on standing PO lasix, home O2 at 3L, and recommended for outpatient cardiac follow-up. The patient was unable to schedule an appointment until her day of presentation on 12/4. In the interim, the patient was noted to have reduced functional status after returning home. The patient has been experiencing gradual swelling of the lower extremities, with her feet having swollen to the point she can no longer fit in her own footwear. The patient has been unable to lie flat in bed, requiring many pillows to prop her up. The patient has resorted to sleeping in a recliner to maintain an upright position at night. In addition, the patient normally ambulated with a walker throughout her home, but following her recent discharge, the patient has required more assistance with ambulation. Per family, the patient's vision has notably declined over the past month, to the point that the patient cannot readily differentiate objects placed in front of her.    CXR showed b/l pleural effusions with b/l reticular opacities. The patient received 40mg of IV lasix. Family was contacted for collateral and reaffirmed DNR/DNI status and requested to initiate discussions about palliative/hospice care for the patient. Patient admitted to medicine for management of CHF. 89yo woman with a history of dementia, HTN, HLD, bradycardia s/p PPM, and CHF presenting with hypoxia. On the afternoon of 12/4, the patient arrived to an intake appointment for her cardiologist and was found to have an O2 saturation of 85%, for which she was recommended to report directly to the hospital.  Pt with recent admission to [facility] on 10/31 for hypercapnic respiratory failure 2/2 CHF, after which she was initiated on standing PO lasix, home O2 at 3L, and recommended for outpatient cardiac follow-up. The patient was unable to schedule an appointment until her day of presentation on 12/4. In the interim, the patient was noted to have reduced functional status after returning home. The patient has been experiencing gradual swelling of the lower extremities, with her feet having swollen to the point she can no longer fit in her own footwear. The patient has been unable to lie flat in bed, requiring many pillows to prop her up. The patient has resorted to sleeping in a recliner to maintain an upright position at night. In addition, the patient normally ambulated with a walker throughout her home, but following her recent discharge, the patient has required more assistance with ambulation. Per family, the patient's vision has notably declined over the past month, to the point that the patient cannot readily differentiate objects placed in front of her.    CXR showed b/l pleural effusions with b/l reticular opacities. The patient received 40mg of IV lasix. Family was contacted for collateral and reaffirmed DNR/DNI status and requested to initiate discussions about palliative/hospice care for the patient. Patient admitted to medicine for management of CHF.

## 2023-12-05 NOTE — H&P ADULT - PROBLEM SELECTOR PLAN 1
The patient was recently diagnosed with HFrEF in November. Latest EF at 32%. Patient family states she is adherence to outpatient regimen. The patient has begun to experience increasing LE edema over the past month with hypoxia down to 85% on 3L NC. CXR showed b/l pleural effusions and b/l reticular opacities. The patient's hypoxia is likely secondary to her underlying heart failure.    - Continue home carvedilol and enalapril  - Start IV lasix 20mg BID  - Echocardiogram in AM  - Standing daily weights  - Strict I/Os The patient was recently diagnosed with HFrEF in November. Latest EF at 32%. Patient family states she is adherence to outpatient regimen. The patient has begun to experience increasing LE edema over the past month with hypoxia down to 85% on 3L NC. CXR showed b/l pleural effusions and b/l reticular opacities. The patient's hypoxia is likely secondary to her underlying heart failure.    - Continue home carvedilol  - Hold home enalapril i/s/o potential hypotension   - Start IV lasix 40mg BID  - Echocardiogram in AM  - Standing daily weights  - Strict I/Os

## 2023-12-05 NOTE — PATIENT PROFILE ADULT - FALL HARM RISK - HARM RISK INTERVENTIONS
Assistance with ambulation/Assistance OOB with selected safe patient handling equipment/Communicate Risk of Fall with Harm to all staff/Discuss with provider need for PT consult/Monitor gait and stability/Provide patient with walking aids - walker, cane, crutches/Reinforce activity limits and safety measures with patient and family/Tailored Fall Risk Interventions/Visual Cue: Yellow wristband and red socks/Bed in lowest position, wheels locked, appropriate side rails in place/Call bell, personal items and telephone in reach/Instruct patient to call for assistance before getting out of bed or chair/Non-slip footwear when patient is out of bed/[place] to call system/Physically safe environment - no spills, clutter or unnecessary equipment/Purposeful Proactive Rounding/Room/bathroom lighting operational, light cord in reach Assistance with ambulation/Assistance OOB with selected safe patient handling equipment/Communicate Risk of Fall with Harm to all staff/Discuss with provider need for PT consult/Monitor gait and stability/Provide patient with walking aids - walker, cane, crutches/Reinforce activity limits and safety measures with patient and family/Tailored Fall Risk Interventions/Visual Cue: Yellow wristband and red socks/Bed in lowest position, wheels locked, appropriate side rails in place/Call bell, personal items and telephone in reach/Instruct patient to call for assistance before getting out of bed or chair/Non-slip footwear when patient is out of bed/[place] to call system/Physically safe environment - no spills, clutter or unnecessary equipment/Purposeful Proactive Rounding/Room/bathroom lighting operational, light cord in reach

## 2023-12-05 NOTE — H&P ADULT - HISTORY OF PRESENT ILLNESS
The patient is a 89yo woman with a history of dementia, HTN, HLD, bradycardia s/p PPM, and CHF presenting with hypoxia. On the afternoon of 12/4, the patient arrived to an intake appointment for her cardiologist and was found to have an O2 saturation of 85%, for which she was recommended to report directly to the hospital.    The patient had a recent admission to [facility] on 10/31 for hypercapnic respiratory failure 2/2 CHF, after which she was initiated on standing PO lasix, home O2 at 3L, and recommended for outpatient cardiac follow-up. The patient was unable to schedule an appointment until her day of presentation on 12/4. In the interim, the patient was noted to have reduced functional status after returning home. The patient has been experiencing gradual swelling of the lower extremities, with her feet having swollen to the point she can no longer fit in her own footwear. The patient has been unable to lie flat in bed, requiring many pillows to prop her up. The patient has resorted to sleeping in a recliner to maintain an upright position at night. In addition, the patient normally ambulated with a walker throughout her home, but following her recent discharge, the patient has required more assistance with ambulation. Per family, the patient's vision has notably declined over the past month, to the point that the patient cannot readily differentiate objects placed in front of her.    ED Course:  T: Afebrile, HR: 75, BP: 139/77, SpO2 97% 4L NC    Pt denied fevers, chill, N/V/C/D, CP, HA, LOC. No recent travel, no sick contacts. Patient arrived on 4L NC and briefly desaturated to 91%, after which she was increased to 6L and saturated between 94-97%. Oxygen was then titrated back down to 4L and the patient's O2 sat held. Sodium 134, troponin 68, BNP 58811. CXR showed b/l pleural effusions with b/l reticular opacities. The patient received 40mg of IV lasix. Family was contacted for collateral and reaffirmed DNR/DNI status and requested to initiate discussions about palliative/hospice care for the patient. Patient admitted to medicine for management of CHF. The patient is a 89yo woman with a history of dementia, HTN, HLD, bradycardia s/p PPM, and CHF presenting with hypoxia. On the afternoon of 12/4, the patient arrived to an intake appointment for her cardiologist and was found to have an O2 saturation of 85%, for which she was recommended to report directly to the hospital.    The patient had a recent admission to [facility] on 10/31 for hypercapnic respiratory failure 2/2 CHF, after which she was initiated on standing PO lasix, home O2 at 3L, and recommended for outpatient cardiac follow-up. The patient was unable to schedule an appointment until her day of presentation on 12/4. In the interim, the patient was noted to have reduced functional status after returning home. The patient has been experiencing gradual swelling of the lower extremities, with her feet having swollen to the point she can no longer fit in her own footwear. The patient has been unable to lie flat in bed, requiring many pillows to prop her up. The patient has resorted to sleeping in a recliner to maintain an upright position at night. In addition, the patient normally ambulated with a walker throughout her home, but following her recent discharge, the patient has required more assistance with ambulation. Per family, the patient's vision has notably declined over the past month, to the point that the patient cannot readily differentiate objects placed in front of her.    ED Course:  T: Afebrile, HR: 75, BP: 139/77, SpO2 97% 4L NC    Pt denied fevers, chill, N/V/C/D, CP, HA, LOC. No recent travel, no sick contacts. Patient arrived on 4L NC and briefly desaturated to 91%, after which she was increased to 6L and saturated between 94-97%. Oxygen was then titrated back down to 4L and the patient's O2 sat held. Sodium 134, troponin 68, BNP 71756. CXR showed b/l pleural effusions with b/l reticular opacities. The patient received 40mg of IV lasix. Family was contacted for collateral and reaffirmed DNR/DNI status and requested to initiate discussions about palliative/hospice care for the patient. Patient admitted to medicine for management of CHF.

## 2023-12-05 NOTE — H&P ADULT - NSHPPHYSICALEXAM_GEN_ALL_CORE
Vital Signs Last 12 Hrs  T(F): 98 (12-05-23 @ 00:20), Max: 98.2 (12-04-23 @ 19:53)  HR: 77 (12-05-23 @ 00:20) (75 - 77)  BP: 115/58 (12-05-23 @ 00:20) (97/57 - 136/72)  BP(mean): 98 (12-04-23 @ 22:57) (70 - 98)  RR: 20 (12-05-23 @ 00:20) (20 - 24)  SpO2: 97% (12-05-23 @ 01:53) (92% - 97%)    PHYSICAL EXAM:  Constitutional: NAD, comfortable in bed.  HEENT: NC/AT, PERRLA, EOMI but patient had difficulty tracking fingertip, patient able to make eye contact when requested, but unable to discern objects both close and far away, patient mental status unable to test peripheral vision; no conjunctival pallor or scleral icterus, MMM  Neck: Supple, no JVD  Respiratory: Crackles in b/l lung fields, L > R in lower lung fields   Cardiovascular: RRR, normal S1 and S2, no m/r/g.   Gastrointestinal: +BS, soft NTND, no guarding or rebound tenderness, no palpable masses   Extremities: wwp; no cyanosis, clubbing. +2 pitting edema  Vascular: Pulses equal and strong throughout.   Neurological: AAOx2 (self, year), no CN deficits, strength and sensation intact throughout.   Skin: No gross skin abnormalities or rashes

## 2023-12-05 NOTE — H&P ADULT - PROBLEM SELECTOR PLAN 5
Activity: Fall risk, PT consult  Consultants: Palliative  DVT ppx: Lovenox  Diet: Regular  Dispo: Floors  Directive: DNR/DNI  Electrolytes: Replete as necessary The patient has a history of hypertension.    - Continue home carvedilol  - Hold home enalapril  - Start IV lasix 40mg BID Patient has known history of bradycardia for which she received a pacemaker.    - Interrogate pacemaker  - Continue to trend vitals

## 2023-12-05 NOTE — RAPID RESPONSE TEAM SUMMARY - NSSITUATIONBACKGROUNDRRT_GEN_ALL_CORE
89yo woman with a history of dementia, HTN, HLD, bradycardia s/p PPM, and CHF presenting with hypoxia likely secondary to CHF exacerbation.    RRT was called due to respiratory distress. Per ED documentation, the patient already had a MOLST filled prior to admission and is DNR/DNI and the family is interested in arranging for hospice services and palliative care during this admission (the patient is unable to make medical decisions for herself due to dementia and is A&Ox1 at the time of the RRT). She was already on high flow nasal cannula and lacks the mental status to tolerate BiPAP, thus there is no room for further intervention for her respiratory failure. In light of this, the rapid response was ended, with the primary team at bedside to order further medications for her respiratory distress (she received morphine earlier in the morning). The patient's family will be arriving shortly to decide whether to transition the patient to comfort measures only.

## 2023-12-05 NOTE — H&P ADULT - ASSESSMENT
The patient is a 89yo woman with a history of dementia, HTN, HLD, bradycardia s/p PPM, and CHF presenting with hypoxia secondary to CHF. The patient is a 89yo woman with a history of dementia, HTN, HLD, bradycardia s/p PPM, and CHF presenting with hypoxia secondary to CHF. The patient is a 89yo woman with a history of dementia, HTN, HLD, bradycardia s/p PPM, and CHF presenting with hypoxia likely secondary to CHF exacerbation

## 2023-12-05 NOTE — H&P ADULT - NSICDXPASTMEDICALHX_GEN_ALL_CORE_FT
PAST MEDICAL HISTORY:  Bradycardia     Chronic HFrEF (heart failure with reduced ejection fraction)     Dementia     HLD (hyperlipidemia)     HTN (hypertension)

## 2023-12-05 NOTE — PROGRESS NOTE ADULT - PROBLEM SELECTOR PLAN 5
The patient has a history of hypertension.    - Continue home carvedilol  - Hold home enalapril  - Start IV lasix 40mg BID

## 2023-12-05 NOTE — H&P ADULT - PROBLEM SELECTOR PLAN 8
Activity: Fall risk, PT consult  Consultants: Palliative  DVT ppx: Lovenox  Diet: Regular  Dispo: Floors  Directive: DNR/DNI  Electrolytes: Replete as necessary  Precautions: Fall, Aspiration

## 2023-12-05 NOTE — CONSULT NOTE ADULT - PROBLEM SELECTOR RECOMMENDATION 5
kps 10-20%    PARUL Miles  Please contact me via Teams  between 6am-2pm. If not answering, please call the palliative care pager [phone]    After 2pm and on weekends, please see the contact information below:    In the event of newly developing, evolving, or worsening symptoms between 2pm and 5pm please contact the Palliative Medicine via extension [phone] for assistance.  After 5pm please contact team via pager (if the patient is at [facility] #[phone] or if the patient is at [facility] #[phone]) The Geriatric and Palliative Medicine service has coverage 24 hours a day/ 7 days a week to provide medical recommendations regarding symptom management needs via telephone kps 10-20%    PARUL Miles  Please contact me via Teams  between 6am-2pm. If not answering, please call the palliative care pager [phone]    After 2pm and on weekends, please see the contact information below:    In the event of newly developing, evolving, or worsening symptoms between 2pm and 5pm please contact the Palliative Medicine via extension [phone] for assistance.  After 5pm please contact team via pager (if the patient is at [facility] #[phone] or if the patient is at [facility] #[phone]) The Geriatric and Palliative Medicine service has coverage 24 hours a day/ 7 days a week to provide medical recommendations regarding symptom management needs via telephone

## 2023-12-05 NOTE — CONSULT NOTE ADULT - CONVERSATION DETAILS
Met with md from primary team and family at bedside including Son, grandson and grandson wife regarding ACP and GOC.  Pt unable to participate. Reviewed CHF and presentation to the hospital as well as current symptoms. Pt is s/p RRT the am. All have a good understanding of pts medical conditions and hospital course.  Able to teach back information provided.  Reviewed pts sob and use of accessory muscles to breath, need for hi flow, hypotension 70/40's, and agitation.  Prognosis of hours to days provided.  We also discussed if pt did stabilize that the goal would be home with hospice.  We discussed the medical management that the pt has received and the goal of keeping her comfortable moving forward.  We discussed the use of opiates for comfort with breathing and pain and ativan for agitation. Meds ordered for symptom management.  Palliative care will continue to follow.

## 2023-12-05 NOTE — H&P ADULT - NSHPREVIEWOFSYSTEMS_GEN_ALL_CORE
Gen: No fever, normal appetite  Eyes: No eye irritation or discharge, poor vision  ENT: No ear pain, congestion, sore throat  Resp: See HPI  Cardiovascular: No chest pain or palpitation  Gastroenteric: No nausea/vomiting, diarrhea, constipation  :  No change in urine output; no dysuria  MS: No joint or muscle pain  Skin: No rashes  Neuro: No headache; no abnormal movements  Remainder negative, except as per the HPI

## 2023-12-05 NOTE — PROGRESS NOTE ADULT - SUBJECTIVE AND OBJECTIVE BOX
PROGRESS NOTE:     Patient is a 89y old  Female who presents with a chief complaint of Hypoxia (05 Dec 2023 12:11)      SUBJECTIVE / OVERNIGHT EVENTS: Pt seen this AM desatting to high 80s, low 90s on NRB, appearing uncomfortable. Not able to answer questions appropriately. Given lasix and morphine. Rapid response called later in the morning, per RRT note. Upon reassessment with family at bedside, pt more alert and oriented, appropriately answering some questions    ADDITIONAL REVIEW OF SYSTEMS: 10 point ROS negative except per HPI    MEDICATIONS  (STANDING):  aspirin enteric coated 81 milliGRAM(s) Oral daily  carvedilol 12.5 milliGRAM(s) Oral every 12 hours  enoxaparin Injectable 40 milliGRAM(s) SubCutaneous every 24 hours  furosemide   Injectable 40 milliGRAM(s) IV Push two times a day    MEDICATIONS  (PRN):  acetaminophen     Tablet .. 650 milliGRAM(s) Oral every 6 hours PRN Temp greater or equal to 38C (100.4F), Mild Pain (1 - 3)  aluminum hydroxide/magnesium hydroxide/simethicone Suspension 30 milliLiter(s) Oral every 4 hours PRN Dyspepsia  LORazepam   Injectable 0.5 milliGRAM(s) IV Push every 8 hours PRN Agitation  morphine  - Injectable 2 milliGRAM(s) IV Push every 3 hours PRN dyspnea/use of accessory mucles  morphine  - Injectable 2 milliGRAM(s) IV Push every 3 hours PRN Moderate Pain (4 - 6)  ondansetron Injectable 4 milliGRAM(s) IV Push every 8 hours PRN Nausea and/or Vomiting      CAPILLARY BLOOD GLUCOSE      POCT Blood Glucose.: 153 mg/dL (05 Dec 2023 09:42)    I&O's Summary      PHYSICAL EXAM:  Vital Signs Last 24 Hrs  T(C): 36.4 (05 Dec 2023 06:00), Max: 37.1 (04 Dec 2023 14:20)  T(F): 97.6 (05 Dec 2023 06:00), Max: 98.7 (04 Dec 2023 14:20)  HR: 97 (05 Dec 2023 09:18) (75 - 97)  BP: 135/74 (05 Dec 2023 08:33) (97/57 - 139/77)  BP(mean): 98 (04 Dec 2023 22:57) (70 - 98)  RR: 36 (05 Dec 2023 09:18) (18 - 36)  SpO2: 83% (05 Dec 2023 09:18) (83% - 97%)    Parameters below as of 05 Dec 2023 09:18  Patient On (Oxygen Delivery Method): nasal cannula, high flow  O2 Flow (L/min): 50  O2 Concentration (%): 50    CONSTITUTIONAL: AAOx1-2, comfortable  RESPIRATORY: improved respiratory effort; b/l crackles  CARDIOVASCULAR: Regular rate and rhythm, normal S1 and S2, no murmur/rub/gallop; + b/l LE edema; Peripheral pulses are 2+ bilaterally  ABDOMEN: Nontender to palpation, no rebound/guarding; No hepatosplenomegaly  MUSCLOSKELETAL: no clubbing or cyanosis of digits; no joint swelling or tenderness to palpation  NEURO: CN 2-12 grossly intact, moves all limbs spontaneously      LABS:                          9.8    7.30  )-----------( 211      ( 04 Dec 2023 18:19 )             30.7     12-04    134<L>  |  93<L>  |  18  ----------------------------<  148<H>  3.9   |  31  |  0.88    Ca    9.4      04 Dec 2023 18:19  Phos  3.1     12-04  Mg     2.2     12-04    TPro  6.8  /  Alb  3.4  /  TBili  0.8  /  DBili  x   /  AST  13  /  ALT  14  /  AlkPhos  94  12-04    PT/INR - ( 04 Dec 2023 18:19 )   PT: 11.5 sec;   INR: 1.10 ratio         PTT - ( 04 Dec 2023 18:19 )  PTT:28.5 sec      -----    MICRO  Urinalysis Basic - ( 04 Dec 2023 18:19 )    Color: x / Appearance: x / SG: x / pH: x  Gluc: 148 mg/dL / Ketone: x  / Bili: x / Urobili: x   Blood: x / Protein: x / Nitrite: x   Leuk Esterase: x / RBC: x / WBC x   Sq Epi: x / Non Sq Epi: x / Bacteria: x        -----    TRENDS  Hemoglobin: 9.8 g/dL (12-04 @ 18:19)    Creatinine Trend: 0.88<--  ----  RADIOLOGY & ADDITIONAL TESTS:  Results Reviewed:   Imaging Personally Reviewed:  Electrocardiogram Personally Reviewed:    COORDINATION OF CARE:  Care Discussed with Consultants/Other Providers [Y/N]:  Prior or Outpatient Records Reviewed [Y/N]:

## 2023-12-05 NOTE — H&P ADULT - NSHPSOCIALHISTORY_GEN_ALL_CORE
The patient lives at home with her carolineon. Prior to admission on 10/31, the patient was able to ambulate with a walker on the floor of the house that she lived in and could feed herself and warm up frozen food. Recently the patient's ability to ambulate has decreased drastically and the patient is unable to feed herself. Her family suspects this is due to a change in vision. The patient receives assistance 2-3 times a week from health aides to assist with ADLs.

## 2023-12-05 NOTE — PATIENT PROFILE ADULT - FUNCTIONAL ASSESSMENT - BASIC MOBILITY 6.
1-calculated by average/Not able to assess (calculate score using [facility] averaging method)  1-calculated by average/Not able to assess (calculate score using [facility] averaging method)

## 2023-12-05 NOTE — H&P ADULT - PROBLEM SELECTOR PROBLEM 1
Chronic HFrEF (heart failure with reduced ejection fraction) Acute on chronic systolic congestive heart failure

## 2023-12-05 NOTE — CONSULT NOTE ADULT - NS ATTEST RISK PROBLEM GEN_ALL_CORE FT
1. Number and complexity of problems addressed for this patient:    1.1 Moderate (At least 1)  [x ] 1 or more chronic illnesses with exacerbation, progression, or side effects of treatment  [ ] 2 or more stable chronic illnesses  [ ] 1 undiagnosed new problem with uncertain prognosis  [x ] 1 acute illness with systemic symptoms  [ ] 1 acute complicated injury  1.2 High (At least 1)   [ ] 1 or more chronic illnesses with severe exacerbation, progression, or side effects of treatment  [ ] 1 acute or chronic illnesses or injuries that may pose a threat to life or bodily function    2. Amount and/or Complexity of Data that was Reviewed and Analyzed for this case:       Moderate (1 out of 3)       High (2 out of 3)  2.1. (Any combination of 3 of the following)   [ ] Prior External notes were reviewed  [ ] Each test result was reviewed (see "LABS" and "RADIOLOGY & ADDITIONAL STUDIES" above)  [ ] The following tests were ordered and/or reviewed (Only count 1 point for ordering or reviewing a unique test):  	[ x]CBC  	[ x] Chemistry   	[x ] Imaging   	[ ] Other:   [ ] Assessment requiring an independent historian   		Name of historian and relationship:   2.2  [ x] Personally review and interpretation of  image or testing   2.3  [ ] Discussion of management or test interpretation with external physician/other qualified health care professional\appropriate source (not separately reported)    3. Risk of Complications and/or Morbidity or Mortality of for this Patient’s Management:  3.1 Moderate risk of morbidity from additional diagnostic testing or treatment (At least 1):   [x ] Prescription drug management   [ ] Decision regarding minor surgery, treatment, or procedure with identified patient or procedure risk factors  [ ] Decision regarding elective major surgery, treatment, or procedure without identified patient or procedure risk factors   [ ] Diagnosis or treatment significantly limited by social determinants of health   [ ] Other:   3.2 High risk of morbidity from additional diagnostic testing or treatment (At least 1):   [ ] Drug therapy requiring intensive monitoring for toxicity   [ ] Decision regarding elective major surgery, treatment, or procedure with identified patient or procedure risk factors   [ ] Decision regarding emergency major surgery, treatment, or procedure   [ ] Decision regarding hospitalization or escalation of hospital-level of care  [ x] Decision not to resuscitate, not to intubate, or to de-escalate care because of poor prognosis   [ ] Decision to proceed or not with artificial nutrition   [ x] Parenteral controlled substance  [ ] Other:

## 2023-12-05 NOTE — CONSULT NOTE ADULT - PROBLEM SELECTOR RECOMMENDATION 4
multifactorial including but not limited to respiratory failure, CHF exacerbations, medications, dementia, hypoxia,   c/w full supportive care

## 2023-12-05 NOTE — H&P ADULT - PROBLEM SELECTOR PLAN 2
Per patient family, endorsed a progressive decline in vision and concerned that this loss of vision has led to the patient growing more confused and disoriented than baseline.    - Head CT  - Ophthalmology consult The patient presenting in respiratory failure with hypoxia from her outpatient medical visit. Patient O2 sat was at 85% on 3L NC and has required increased O2 requirements during hospitalization. Hypoxia is likely secondary to pulmonary edema from acute heart failure.    - Treat heart failure as above  - Monitor O2 sat  - LE doppler pending

## 2023-12-05 NOTE — PROGRESS NOTE ADULT - ASSESSMENT
The patient is a 89yo woman with a history of dementia, HTN, HLD, bradycardia s/p PPM, and CHF presenting with hypoxia likely secondary to CHF exacerbation The patient is a 89yo woman with a history of dementia, HTN, HLD, bradycardia s/p PPM, and CHF presenting with hypoxia likely secondary to CHF exacerbation

## 2023-12-05 NOTE — H&P ADULT - PROBLEM SELECTOR PLAN 6
The patient has history of hyperlipidemia, currently not receiving medications.    - Lipid profile in AM  - Encourage initiation of statin in outpatient setting The patient has a history of hypertension.    - Continue home carvedilol  - Hold home enalapril  - Start IV lasix 40mg BID

## 2023-12-05 NOTE — H&P ADULT - PROBLEM SELECTOR PLAN 4
The patient has history of hyperlipidemia, currently not receiving medications.    - Lipid profile in AM  - Encourage initiation of statin in outpatient setting Per patient family, endorsed a progressive decline in vision and concerned that this loss of vision has led to the patient growing more confused and disoriented than baseline.    - Head CT  - Consider Ophthalmology consult

## 2023-12-05 NOTE — H&P ADULT - NSHPLABSRESULTS_GEN_ALL_CORE
LABS:                         9.8    7.30  )-----------( 211      ( 04 Dec 2023 18:19 )             30.7     12-04    134<L>  |  93<L>  |  18  ----------------------------<  148<H>  3.9   |  31  |  0.88    Ca    9.4      04 Dec 2023 18:19  Phos  3.1     12-04  Mg     2.2     12-04    TPro  6.8  /  Alb  3.4  /  TBili  0.8  /  DBili  x   /  AST  13  /  ALT  14  /  AlkPhos  94  12-04    PT/INR - ( 04 Dec 2023 18:19 )   PT: 11.5 sec;   INR: 1.10 ratio         PTT - ( 04 Dec 2023 18:19 )  PTT:28.5 sec  Urinalysis Basic - ( 04 Dec 2023 18:19 )    Color: x / Appearance: x / SG: x / pH: x  Gluc: 148 mg/dL / Ketone: x  / Bili: x / Urobili: x   Blood: x / Protein: x / Nitrite: x   Leuk Esterase: x / RBC: x / WBC x   Sq Epi: x / Non Sq Epi: x / Bacteria: x                RADIOLOGY, EKG & ADDITIONAL TESTS:     Xray Chest 1 View AP/PA (12.04.23 @ 18:26)     INTERPRETATION:  Small layering right and Moderate left pleural   effusions. Bilateral reticular opacities, decreased from prior.

## 2023-12-05 NOTE — PROGRESS NOTE ADULT - PROBLEM SELECTOR PLAN 3
The patient has a known history of dementia with waxing and waning cognition. She has had a notable decline in her ability to perform her ADLs over the past month per her family.    - PT/OT eval  - Fall/aspiration precautions  - Palliative consult appreciated: will continue medical tx but also focus on pt's comfort   - prn morphine and ativan ordered for comfort

## 2023-12-05 NOTE — H&P ADULT - ATTENDING COMMENTS
Pt was seen and examined during key portion of E/M service. Case discussed with resident. H&P reviewed and edited where appropriate. Other than the following, I agree with the above history, exam, assessment, and plan.  The patient is a 89yo woman with a history of dementia, HTN, HLD, bradycardia s/p PPM, and CHF presenting with hypoxia likely secondary to CHF exacerbation.  lasix 40 IV BID for now. continuous pulse ox. LE duplex. cont to monitor BP. restart BP meds as pressures allow. GOC discussion.

## 2023-12-06 NOTE — PROGRESS NOTE ADULT - PROBLEM SELECTOR PLAN 4
Per patient family, endorsed a progressive decline in vision and concerned that this loss of vision has led to the patient growing more confused and disoriented than baseline.    - consider Head CT  - Consider Ophthalmology consult  - pt made full comfort 12/5 - dc'd diagnostic and medical intervention to focus on comfort and quality of life

## 2023-12-06 NOTE — PROGRESS NOTE ADULT - PROBLEM SELECTOR PLAN 1
The patient was recently diagnosed with HFrEF in November. Latest EF at 32%. Patient family states she is adherence to outpatient regimen. The patient has begun to experience increasing LE edema over the past month with hypoxia down to 85% on 3L NC. CXR showed b/l pleural effusions and b/l reticular opacities. The patient's hypoxia is likely secondary to her underlying heart failure.    - Continue home carvedilol  - Hold home enalapril i/s/o hypotension   - on IV lasix 40mg BID  - currently on HFNC  - TTE ordered  - Standing daily weights  - Strict I/Os  - pt made full comfort 12/5 - dc'd diagnostic and medical intervention to focus on comfort and quality of life

## 2023-12-06 NOTE — PROGRESS NOTE ADULT - PROBLEM SELECTOR PLAN 5
The patient has a history of hypertension.    - Continue home carvedilol  - Hold home enalapril  - Start IV lasix 40mg BID  - pt made full comfort 12/5 - dc'd diagnostic and medical intervention to focus on comfort and quality of life

## 2023-12-06 NOTE — PROGRESS NOTE ADULT - PROBLEM SELECTOR PLAN 3
The patient has a known history of dementia with waxing and waning cognition. She has had a notable decline in her ability to perform her ADLs over the past month per her family.    - PT/OT eval  - Fall/aspiration precautions  - pt made full comfort 12/5 - dc'd diagnostic and medical intervention to focus on comfort and quality of life

## 2023-12-06 NOTE — PROGRESS NOTE ADULT - SUBJECTIVE AND OBJECTIVE BOX
SUBJECTIVE AND OBJECTIVE:  unarousable,  noted increased WOB, tachypnea 30's   Indication for Geriatrics and Palliative Care Services/INTERVAL HPI: Goals of care     OVERNIGHT EVENTS:  RRT for respiratory distress     DNR on chart:DNI  DNI      Allergies    No Known Allergies    Intolerances    MEDICATIONS  (STANDING):  chlorhexidine 4% Liquid 1 Application(s) Topical daily    MEDICATIONS  (PRN):  acetaminophen     Tablet .. 650 milliGRAM(s) Oral every 6 hours PRN Temp greater or equal to 38C (100.4F), Mild Pain (1 - 3)  LORazepam   Injectable 0.5 milliGRAM(s) IV Push every 8 hours PRN Agitation  morphine  - Injectable 2 milliGRAM(s) IV Push every 3 hours PRN Moderate Pain (4 - 6)  morphine  - Injectable 2 milliGRAM(s) IV Push every 3 hours PRN dyspnea/use of accessory mucles  ondansetron Injectable 4 milliGRAM(s) IV Push every 8 hours PRN Nausea and/or Vomiting      ITEMS UNCHECKED ARE NOT PRESENT    PRESENT SYMPTOMS: [x ]Unable to self-report - see [ ] CPOT [ ] PAINADS [ ] RDOS  Source if other than patient:  [ ]Family   [ ]Team     Pain:  [ ]yes [ ]no  QOL impact -   Location -                    Aggravating factors -  Quality -  Radiation -  Timing-  Severity (0-10 scale):  Minimal acceptable level (0-10 scale):     CPOT:    https://www.sccm.org/getattachment/dgm47y18-5p9u-4b5g-5d4l-4912n9057e7g/Critical-Care-Pain-Observation-Tool-(CPOT)    PAIN AD Score:	0  [email or web address] (Ctrl + left click to view)    Dyspnea:                           [ ]Mild [ ]Moderate [ ]Severe    RDOS:  0 to 2  minimal or no respiratory distress   3  mild distress  4 to 6 moderate distress  5-6  >7 severe distress  [email or web address]_Distress_Observation_Scale.pdf (Ctrl +  left click to view)     Anxiety:                             [ ]Mild [ ]Moderate [ ]Severe  Fatigue:                             [ ]Mild [ ]Moderate [ ]Severe  Nausea:                             [ ]Mild [ ]Moderate [ ]Severe  Loss of appetite:              [ ]Mild [ ]Moderate [ ]Severe  Constipation:                    [ ]Mild [ ]Moderate [ ]Severe    PCSSQ[Palliative Care Spiritual Screening Question]   Severity (0-10):5  Score of 4 or > indicate consideration of Chaplaincy referral.  Chaplaincy Referral: [ ] yes [ ] refused [ ] following xx deferred    Caregiver [place]? : [ x] yes [ ] no  Social work referral [ ] Patient & Family Centered Care Referral [ ]     Anticipatory Grief present?:  [x ] yes [ ] no  Social work referral [ ] Patient & Family Centered Care Referral [ ]      Other Symptoms:  [ x]All other review of systems negative     Palliative Performance Status Version 2:  10       %      http://npcrc.org/files/news/palliative_performance_scale_ppsv2.pdf  PHYSICAL EXAM:  Vital Signs Last 24 Hrs  T(C): 36.5 (06 Dec 2023 06:57), Max: 36.5 (05 Dec 2023 20:02)  T(F): 97.7 (06 Dec 2023 06:57), Max: 97.7 (05 Dec 2023 20:02)  HR: 94 (06 Dec 2023 09:48) (83 - 94)  BP: 149/71 (06 Dec 2023 06:57) (84/58 - 149/71)  BP(mean): --  RR: 20 (06 Dec 2023 09:48) (20 - 24)  SpO2: 96% (06 Dec 2023 09:48) (91% - 96%)    Parameters below as of 06 Dec 2023 09:48  Patient On (Oxygen Delivery Method): nasal cannula, high flow  O2 Flow (L/min): 50  O2 Concentration (%): 50 I&O's Summary     GENERAL: [ ]Cachexia    [ ]Alert  [ ]Oriented x   [ ]Lethargic  [x ]Unarousable  [ ]Verbal  [ ]Non-Verbal  Behavioral:   [ ]Anxiety  [ ]Delirium [ ]Agitation [ x]Other  HEENT:  [ ]Normal   [ x]Dry mouth   [ ]ET Tube/Trach  [ ]Oral lesions  PULMONARY:   [ ]Clear [ x]Tachypnea  [ ]Audible excessive secretions   [ ]Rhonchi        [ ]Right [ ]Left [ ]Bilateral  [ ]Crackles        [ ]Right [ ]Left [ ]Bilateral  [ ]Wheezing     [ ]Right [ ]Left [ ]Bilateral  [ ]Diminished BS [ ] Right [ ]Left [ ]Bilateral  CARDIOVASCULAR:    [ ]Regular [ ]Irregular [x ]Tachy  [ ]Tigre [ ]Murmur [ ]Other  GASTROINTESTINAL:  [ x]Soft  [ ]Distended   [ x]+BS  [ ]Non tender [ ]Tender  [ ]Other [ ]PEG [ ]OGT/ NGT   Last BM:   GENITOURINARY:  [ ]Normal [ x]Incontinent   [ ]Oliguria/Anuria   [ ]Anaya  MUSCULOSKELETAL:   [ ]Normal   [ ]Weakness  [ x]Bed/Wheelchair bound [ ]Edema  NEUROLOGIC:   [ ]No focal deficits  [ x] Cognitive impairment  [ ] Dysphagia [ ]Dysarthria [ ] Paresis [ ]Other   SKIN:   [x ]Normal  [ ]Rash  [ ]Other  [ ]Pressure ulcer(s) [ ]y [ ]n present on admission    CRITICAL CARE:  [ ]Shock Present  [ ]Septic [ ]Cardiogenic [ ]Neurologic [ ]Hypovolemic  [ ]Vasopressors [ ]Inotropes  [ ]Respiratory failure present [ ]Mechanical Ventilation [ ]Non-invasive ventilatory support [ ]High-Flow   [ ]Acute  [ ]Chronic [ ]Hypoxic  [ ]Hypercarbic [ ]Other  [ ]Other organ failure     LABS:                        9.8    7.30  )-----------( 211      ( 04 Dec 2023 18:19 )             30.7   12-04    134<L>  |  93<L>  |  18  ----------------------------<  148<H>  3.9   |  31  |  0.88    Ca    9.4      04 Dec 2023 18:19  Phos  3.1     12-04  Mg     2.2     12-04    TPro  6.8  /  Alb  3.4  /  TBili  0.8  /  DBili  x   /  AST  13  /  ALT  14  /  AlkPhos  94  12-04  PT/INR - ( 04 Dec 2023 18:19 )   PT: 11.5 sec;   INR: 1.10 ratio         PTT - ( 04 Dec 2023 18:19 )  PTT:28.5 sec    Urinalysis Basic - ( 04 Dec 2023 18:19 )    Color: x / Appearance: x / SG: x / pH: x  Gluc: 148 mg/dL / Ketone: x  / Bili: x / Urobili: x   Blood: x / Protein: x / Nitrite: x   Leuk Esterase: x / RBC: x / WBC x   Sq Epi: x / Non Sq Epi: x / Bacteria: x      RADIOLOGY & ADDITIONAL STUDIES:    < from: Xray Chest 1 View-PORTABLE IMMEDIATE (Xray Chest 1 View-PORTABLE IMMEDIATE .) (12.05.23 @ 10:25) >  INTERPRETATION:  EXAMINATION: XR CHEST IMMEDIATE    CLINICAL INDICATION: RRT. Shortness of breath.    TECHNIQUE: Single frontal, portable view of the chest was obtained.    COMPARISON: Chest x-ray 12/4/2023.    FINDINGS:  Pacemaker overlying the left chest wall with its leads terminating in   right atrium and right ventricle.  The heart size cannot be accurately assessed inthis projection.  Bilateral perihilar airspace opacities.  Small bilateral layering pleural effusions, greater on the left.  No pneumothorax.  No acute bony abnormality.    IMPRESSION:  Bilateral perihilar airspace opacities.  Small bilateral layering pleural effusions, left greater than right.    Findings were discussed by Dr. Gordon with Dr. Morales on 12/5/2023   9:56 AM with read back confirmation.    --- End of Report ---    < end of copied text >      Protein Calorie Malnutrition Present: [ ]mild [ ]moderate [ ]severe [ ]underweight [ ]morbid obesity  https://www.andeal.org/vault/2440/web/files/ONC/Table_Clinical%20Characteristics%20to%20Document%20Malnutrition-White%20JV%20et%20al%276715.pdf    Height (cm): 147.3 (12-05-23 @ 00:20), 147.3 (10-31-23 @ 18:21)  Weight (kg): 53.3 (12-05-23 @ 00:20), 49.6 (10-31-23 @ 18:21)  BMI (kg/m2): 24.6 (12-05-23 @ 00:20), 22.9 (10-31-23 @ 18:21)    [ ]PPSV2 < or = 30%  [ ]significant weight loss [ x]poor nutritional intake [ ]anasarca[ ]Artificial Nutrition    Other REFERRALS:  [ ]Hospice  [ ]Child Life  [ ]Social Work  [x ]Case management [ ]Holistic Therapy      SUBJECTIVE AND OBJECTIVE:  unarousable,  noted increased WOB, tachypnea 30's   Indication for Geriatrics and Palliative Care Services/INTERVAL HPI: Goals of care     OVERNIGHT EVENTS:  RRT for respiratory distress     DNR on chart:DNI  DNI      Allergies    No Known Allergies    Intolerances    MEDICATIONS  (STANDING):  chlorhexidine 4% Liquid 1 Application(s) Topical daily    MEDICATIONS  (PRN):  acetaminophen     Tablet .. 650 milliGRAM(s) Oral every 6 hours PRN Temp greater or equal to 38C (100.4F), Mild Pain (1 - 3)  LORazepam   Injectable 0.5 milliGRAM(s) IV Push every 8 hours PRN Agitation  morphine  - Injectable 2 milliGRAM(s) IV Push every 3 hours PRN Moderate Pain (4 - 6)  morphine  - Injectable 2 milliGRAM(s) IV Push every 3 hours PRN dyspnea/use of accessory mucles  ondansetron Injectable 4 milliGRAM(s) IV Push every 8 hours PRN Nausea and/or Vomiting      ITEMS UNCHECKED ARE NOT PRESENT    PRESENT SYMPTOMS: [x ]Unable to self-report - see [ ] CPOT [ ] PAINADS [ ] RDOS  Source if other than patient:  [ ]Family   [ ]Team     Pain:  [ ]yes [ ]no  QOL impact -   Location -                    Aggravating factors -  Quality -  Radiation -  Timing-  Severity (0-10 scale):  Minimal acceptable level (0-10 scale):     CPOT:    https://www.sccm.org/getattachment/[email or web address]-8d5y-9u4a-8205a3050a8l/Critical-Care-Pain-Observation-Tool-(CPOT)    PAIN AD Score:	0  http://[email or web address]/[email or web address] (Ctrl + left click to view)    Dyspnea:                           [ ]Mild [ ]Moderate [ ]Severe    RDOS:  0 to 2  minimal or no respiratory distress   3  mild distress  4 to 6 moderate distress  5-6  >7 severe distress  https://[email or web address]/handouts/hen/Respiratory_Distress_Observation_Scale.pdf (Ctrl +  left click to view)     Anxiety:                             [ ]Mild [ ]Moderate [ ]Severe  Fatigue:                             [ ]Mild [ ]Moderate [ ]Severe  Nausea:                             [ ]Mild [ ]Moderate [ ]Severe  Loss of appetite:              [ ]Mild [ ]Moderate [ ]Severe  Constipation:                    [ ]Mild [ ]Moderate [ ]Severe    PCSSQ[Palliative Care Spiritual Screening Question]   Severity (0-10):5  Score of 4 or > indicate consideration of Chaplaincy referral.  Chaplaincy Referral: [ ] yes [ ] refused [ ] following xx deferred    Caregiver [place]? : [ x] yes [ ] no  Social work referral [ ] Patient & Family Centered Care Referral [ ]     Anticipatory Grief present?:  [x ] yes [ ] no  Social work referral [ ] Patient & Family Centered Care Referral [ ]      Other Symptoms:  [ x]All other review of systems negative     Palliative Performance Status Version 2:  10       %      http://npcrc.org/files/news/palliative_performance_scale_ppsv2.pdf  PHYSICAL EXAM:  Vital Signs Last 24 Hrs  T(C): 36.5 (06 Dec 2023 06:57), Max: 36.5 (05 Dec 2023 20:02)  T(F): 97.7 (06 Dec 2023 06:57), Max: 97.7 (05 Dec 2023 20:02)  HR: 94 (06 Dec 2023 09:48) (83 - 94)  BP: 149/71 (06 Dec 2023 06:57) (84/58 - 149/71)  BP(mean): --  RR: 20 (06 Dec 2023 09:48) (20 - 24)  SpO2: 96% (06 Dec 2023 09:48) (91% - 96%)    Parameters below as of 06 Dec 2023 09:48  Patient On (Oxygen Delivery Method): nasal cannula, high flow  O2 Flow (L/min): 50  O2 Concentration (%): 50 I&O's Summary     GENERAL: [ ]Cachexia    [ ]Alert  [ ]Oriented x   [ ]Lethargic  [x ]Unarousable  [ ]Verbal  [ ]Non-Verbal  Behavioral:   [ ]Anxiety  [ ]Delirium [ ]Agitation [ x]Other  HEENT:  [ ]Normal   [ x]Dry mouth   [ ]ET Tube/Trach  [ ]Oral lesions  PULMONARY:   [ ]Clear [ x]Tachypnea  [ ]Audible excessive secretions   [ ]Rhonchi        [ ]Right [ ]Left [ ]Bilateral  [ ]Crackles        [ ]Right [ ]Left [ ]Bilateral  [ ]Wheezing     [ ]Right [ ]Left [ ]Bilateral  [ ]Diminished BS [ ] Right [ ]Left [ ]Bilateral  CARDIOVASCULAR:    [ ]Regular [ ]Irregular [x ]Tachy  [ ]Tigre [ ]Murmur [ ]Other  GASTROINTESTINAL:  [ x]Soft  [ ]Distended   [ x]+BS  [ ]Non tender [ ]Tender  [ ]Other [ ]PEG [ ]OGT/ NGT   Last BM:   GENITOURINARY:  [ ]Normal [ x]Incontinent   [ ]Oliguria/Anuria   [ ]Anaya  MUSCULOSKELETAL:   [ ]Normal   [ ]Weakness  [ x]Bed/Wheelchair bound [ ]Edema  NEUROLOGIC:   [ ]No focal deficits  [ x] Cognitive impairment  [ ] Dysphagia [ ]Dysarthria [ ] Paresis [ ]Other   SKIN:   [x ]Normal  [ ]Rash  [ ]Other  [ ]Pressure ulcer(s) [ ]y [ ]n present on admission    CRITICAL CARE:  [ ]Shock Present  [ ]Septic [ ]Cardiogenic [ ]Neurologic [ ]Hypovolemic  [ ]Vasopressors [ ]Inotropes  [ ]Respiratory failure present [ ]Mechanical Ventilation [ ]Non-invasive ventilatory support [ ]High-Flow   [ ]Acute  [ ]Chronic [ ]Hypoxic  [ ]Hypercarbic [ ]Other  [ ]Other organ failure     LABS:                        9.8    7.30  )-----------( 211      ( 04 Dec 2023 18:19 )             30.7   12-04    134<L>  |  93<L>  |  18  ----------------------------<  148<H>  3.9   |  31  |  0.88    Ca    9.4      04 Dec 2023 18:19  Phos  3.1     12-04  Mg     2.2     12-04    TPro  6.8  /  Alb  3.4  /  TBili  0.8  /  DBili  x   /  AST  13  /  ALT  14  /  AlkPhos  94  12-04  PT/INR - ( 04 Dec 2023 18:19 )   PT: 11.5 sec;   INR: 1.10 ratio         PTT - ( 04 Dec 2023 18:19 )  PTT:28.5 sec    Urinalysis Basic - ( 04 Dec 2023 18:19 )    Color: x / Appearance: x / SG: x / pH: x  Gluc: 148 mg/dL / Ketone: x  / Bili: x / Urobili: x   Blood: x / Protein: x / Nitrite: x   Leuk Esterase: x / RBC: x / WBC x   Sq Epi: x / Non Sq Epi: x / Bacteria: x      RADIOLOGY & ADDITIONAL STUDIES:    < from: Xray Chest 1 View-PORTABLE IMMEDIATE (Xray Chest 1 View-PORTABLE IMMEDIATE .) (12.05.23 @ 10:25) >  INTERPRETATION:  EXAMINATION: XR CHEST IMMEDIATE    CLINICAL INDICATION: RRT. Shortness of breath.    TECHNIQUE: Single frontal, portable view of the chest was obtained.    COMPARISON: Chest x-ray 12/4/2023.    FINDINGS:  Pacemaker overlying the left chest wall with its leads terminating in   right atrium and right ventricle.  The heart size cannot be accurately assessed inthis projection.  Bilateral perihilar airspace opacities.  Small bilateral layering pleural effusions, greater on the left.  No pneumothorax.  No acute bony abnormality.    IMPRESSION:  Bilateral perihilar airspace opacities.  Small bilateral layering pleural effusions, left greater than right.    Findings were discussed by Dr. Gordon with Dr. Morales on 12/5/2023   9:56 AM with read back confirmation.    --- End of Report ---    < end of copied text >      Protein Calorie Malnutrition Present: [ ]mild [ ]moderate [ ]severe [ ]underweight [ ]morbid obesity  https://www.andeal.org/vault/2440/web/files/ONC/Table_Clinical%20Characteristics%20to%20Document%20Malnutrition-White%20JV%20et%20al%353371.pdf    Height (cm): 147.3 (12-05-23 @ 00:20), 147.3 (10-31-23 @ 18:21)  Weight (kg): 53.3 (12-05-23 @ 00:20), 49.6 (10-31-23 @ 18:21)  BMI (kg/m2): 24.6 (12-05-23 @ 00:20), 22.9 (10-31-23 @ 18:21)    [ ]PPSV2 < or = 30%  [ ]significant weight loss [ x]poor nutritional intake [ ]anasarca[ ]Artificial Nutrition    Other REFERRALS:  [ ]Hospice  [ ]Child Life  [ ]Social Work  [x ]Case management [ ]Holistic Therapy

## 2023-12-06 NOTE — PROGRESS NOTE ADULT - PROBLEM SELECTOR PLAN 6
Family at bedside remain in agreement with continued symptom directed approach and awaits bed availability in PCU.  BP stable as stated above

## 2023-12-06 NOTE — PROGRESS NOTE ADULT - PROBLEM SELECTOR PLAN 1
PPS <10% needs assistance with all basic needs   Pt   lives alone  in a first floor apt  and step son lives on the upper  floor. Prior to hospitalization patient was ndependent with adls and used a walker to assist with  ambulation.

## 2023-12-06 NOTE — PROGRESS NOTE ADULT - ASSESSMENT
The patient is a 89yo woman with a history of dementia, HTN, HLD, bradycardia s/p PPM, and CHF presenting with hypoxia likely secondary to CHF exacerbation. Made full comfort on 12/5. The patient is a 89yo woman with a history of dementia, HTN, HLD, bradycardia s/p PPM, and CHF presenting with hypoxia likely secondary to CHF exacerbation. Made full comfort on 12/5.

## 2023-12-06 NOTE — PROGRESS NOTE ADULT - PROBLEM SELECTOR PLAN 2
The patient presenting in respiratory failure with hypoxia from her outpatient medical visit. Patient O2 sat was at 85% on 3L NC and has required increased O2 requirements during hospitalization. Hypoxia is likely secondary to pulmonary edema from acute heart failure.    - Treat heart failure as above  - monitor respiratory status, currently on HFNC  - LE doppler ordered  - pt made full comfort 12/5 - dc'd diagnostic and medical intervention to focus on comfort and quality of life

## 2023-12-06 NOTE — PROGRESS NOTE ADULT - PROBLEM SELECTOR PLAN 3
Pleasure feeds only while awake  Family aware of risk of aspiration and even death.  Instructed family on safe feeding techniques including HOB 45 deg  NPO d/c  STarted easy to chew diet

## 2023-12-06 NOTE — PROGRESS NOTE ADULT - SUBJECTIVE AND OBJECTIVE BOX
PROGRESS NOTE:     Patient is a 89y old  Female who presents with a chief complaint of Hypoxia (06 Dec 2023 07:19)      SUBJECTIVE / OVERNIGHT EVENTS: Pt made full comfort measures yesterday afternoon. Pt seen this AM appearing to have difficulty breathing.    ADDITIONAL REVIEW OF SYSTEMS: 10 point ROS negative except per HPI    MEDICATIONS  (STANDING):  chlorhexidine 4% Liquid 1 Application(s) Topical daily    MEDICATIONS  (PRN):  acetaminophen     Tablet .. 650 milliGRAM(s) Oral every 6 hours PRN Temp greater or equal to 38C (100.4F), Mild Pain (1 - 3)  LORazepam   Injectable 0.5 milliGRAM(s) IV Push every 8 hours PRN Agitation  morphine  - Injectable 2 milliGRAM(s) IV Push every 3 hours PRN dyspnea/use of accessory mucles  morphine  - Injectable 2 milliGRAM(s) IV Push every 3 hours PRN Moderate Pain (4 - 6)  ondansetron Injectable 4 milliGRAM(s) IV Push every 8 hours PRN Nausea and/or Vomiting      CAPILLARY BLOOD GLUCOSE      POCT Blood Glucose.: 153 mg/dL (05 Dec 2023 09:42)    I&O's Summary      PHYSICAL EXAM:  Vital Signs Last 24 Hrs  T(C): 36.5 (06 Dec 2023 06:57), Max: 36.5 (05 Dec 2023 20:02)  T(F): 97.7 (06 Dec 2023 06:57), Max: 97.7 (05 Dec 2023 20:02)  HR: 92 (06 Dec 2023 06:57) (83 - 97)  BP: 149/71 (06 Dec 2023 06:57) (84/58 - 149/71)  BP(mean): --  RR: 22 (06 Dec 2023 06:57) (22 - 36)  SpO2: 95% (06 Dec 2023 06:57) (83% - 95%)    Parameters below as of 06 Dec 2023 06:57  Patient On (Oxygen Delivery Method): nasal cannula, high flow        CONSTITUTIONAL: uncomfortable appearing, well-developed, A&Ox2.  RESPIRATORY: labored respiratory effort; lungs with b/l crackles  CARDIOVASCULAR: Regular rate and rhythm, normal S1 and S2, no murmur/rub/gallop; No lower extremity edema; Peripheral pulses are 2+ bilaterally  ABDOMEN: Nontender to palpation, no rebound/guarding; No hepatosplenomegaly  MUSCLOSKELETAL: no clubbing or cyanosis of digits; no joint swelling or tenderness to palpation  NEURO: CN 2-12 grossly intact, moves all limbs spontaneously      LABS:                          9.8    7.30  )-----------( 211      ( 04 Dec 2023 18:19 )             30.7     12-04    134<L>  |  93<L>  |  18  ----------------------------<  148<H>  3.9   |  31  |  0.88    Ca    9.4      04 Dec 2023 18:19  Phos  3.1     12-04  Mg     2.2     12-04    TPro  6.8  /  Alb  3.4  /  TBili  0.8  /  DBili  x   /  AST  13  /  ALT  14  /  AlkPhos  94  12-04    PT/INR - ( 04 Dec 2023 18:19 )   PT: 11.5 sec;   INR: 1.10 ratio         PTT - ( 04 Dec 2023 18:19 )  PTT:28.5 sec      -----    MICRO  Urinalysis Basic - ( 04 Dec 2023 18:19 )    Color: x / Appearance: x / SG: x / pH: x  Gluc: 148 mg/dL / Ketone: x  / Bili: x / Urobili: x   Blood: x / Protein: x / Nitrite: x   Leuk Esterase: x / RBC: x / WBC x   Sq Epi: x / Non Sq Epi: x / Bacteria: x        -----    TRENDS  Hemoglobin: 9.8 g/dL (12-04 @ 18:19)    Creatinine Trend: 0.88<--  ----  RADIOLOGY & ADDITIONAL TESTS:  Results Reviewed:   Imaging Personally Reviewed:  Electrocardiogram Personally Reviewed:    COORDINATION OF CARE:  Care Discussed with Consultants/Other Providers [Y/N]:  Prior or Outpatient Records Reviewed [Y/N]:

## 2023-12-06 NOTE — PROGRESS NOTE ADULT - PROBLEM SELECTOR PLAN 2
In setting of CHF exacerbation   CXR noted with b/l perihilar opacities andsmall b/l pleural effusions l>r  Currently requiring hiflo, management per primary team  Continue with Morphine as ordered  Utilized one dose for dyspnea

## 2023-12-06 NOTE — PROGRESS NOTE ADULT - ASSESSMENT
The patient is a 89 yo woman with a history of dementia, HTN, HLD, bradycardia s/p PPM, and CHF presenting with hypoxia. On the afternoon of 12/4, the patient arrived to an intake appointment for her cardiologist and was found to have an O2 saturation of 85%, for which she was recommended to report directly to the hospital.  S/p RRT and now on hi flow.  Palliative care called for GOC.  The patient is a 89 yo woman with a history of dementia, HTN, HLD, bradycardia s/p PPM, and CHF presenting with hypoxia. On the afternoon of 12/4, the patient arrived to an intake appointment for her cardiologist and was found to have an O2 saturation of 85%, for which she was recommended to report directly to the hospital.  S/p RRT and now on hi flow.  Palliative care called for GOC.

## 2023-12-06 NOTE — PROGRESS NOTE ADULT - PROBLEM SELECTOR PLAN 6
Activity: Fall risk, PT consult  Consultants: Palliative  DVT ppx: Lovenox  Diet: Regular  Dispo: Floors  Directive: DNR/DNI, full comfort  Electrolytes: Replete as necessary  Precautions: Fall, Aspiration Activity: Fall risk, PT consult  Consultants: Palliative  DVT ppx: n/a  Diet: pleasure feeds  Dispo: pending PCU bed  Directive: DNR/DNI, full comfort  Electrolytes: Replete as necessary  Precautions: Fall, Aspiration

## 2023-12-07 NOTE — PROGRESS NOTE ADULT - SUBJECTIVE AND OBJECTIVE BOX
SUBJECTIVE AND OBJECTIVE:  unarousable,  noted increased WOB, on hi flow   Indication for Geriatrics and Palliative Care Services/INTERVAL HPI: Goals of care/ dyspnea management    OVERNIGHT EVENTS:  no acute events pt required 4 doses of morphine in 24 hrs.    DNR on chart:DNI  DNI      Allergies    No Known Allergies    Intolerances    MEDICATIONS  (STANDING):  chlorhexidine 4% Liquid 1 Application(s) Topical daily      MEDICATIONS  (STANDING):  chlorhexidine 4% Liquid 1 Application(s) Topical daily    MEDICATIONS  (PRN):  acetaminophen     Tablet .. 650 milliGRAM(s) Oral every 6 hours PRN Temp greater or equal to 38C (100.4F), Mild Pain (1 - 3)  LORazepam   Injectable 0.5 milliGRAM(s) IV Push every 8 hours PRN Agitation  morphine  - Injectable 2 milliGRAM(s) IV Push every 3 hours PRN Moderate Pain (4 - 6)  morphine  - Injectable 2 milliGRAM(s) IV Push every 3 hours PRN dyspnea/use of accessory mucles  ondansetron Injectable 4 milliGRAM(s) IV Push every 8 hours PRN Nausea and/or Vomiting        ITEMS UNCHECKED ARE NOT PRESENT    PRESENT SYMPTOMS: [x ]Unable to self-report - see [ ] CPOT [ ] PAINADS [ ] RDOS  Source if other than patient:  [x ]Family   [ ]Team     Pain:  [ ]yes [x ]no  QOL impact -   Location -                    Aggravating factors -  Quality -  Radiation -  Timing-  Severity (0-10 scale):  Minimal acceptable level (0-10 scale):     CPOT:    https://www.[facility].org/getattachment/[email or web address]/Critical-Care-Pain-Observation-Tool-(CPOT)    PAIN AD Score:	0  http://geriatrictoolkit.[place].[facility]/cog/painad.pdf (Ctrl + left click to view)    Dyspnea:                           [ ]Mild [ ]Moderate [ ]Severe    RDOS:  0 to 2  minimal or no respiratory distress   3  mild distress  4 to 6 moderate distress  5-6  >7 severe distress  https://[email or web address]/handouts/hen/Respiratory_Distress_Observation_Scale.pdf (Ctrl +  left click to view)     Anxiety:                             [ ]Mild [ ]Moderate [ ]Severe  Fatigue:                             [ ]Mild [ ]Moderate [x ]Severe  Nausea:                             [ ]Mild [ ]Moderate [ ]Severe  Loss of appetite:              [ ]Mild [ ]Moderate [x ]Severe  Constipation:                    [ ]Mild [ ]Moderate [ ]Severe    PCSSQ[Palliative Care Spiritual Screening Question]   Severity (0-10):5  Score of 4 or > indicate consideration of Chaplaincy referral.  Chaplaincy Referral: [ ] yes [ ] refused [ ] following xx deferred    Caregiver [place]? : [ x] yes [ ] no  Social work referral [ ] Patient & Family Centered Care Referral [ ]     Anticipatory Grief present?:  [x ] yes [ ] no  Social work referral [ ] Patient & Family Centered Care Referral [ ]      Other Symptoms:  [ x]All other review of systems negative     Palliative Performance Status Version 2:  10       %      http://npcrc.org/files/news/palliative_performance_scale_ppsv2.pdf  PHYSICAL EXAM:  Vital Signs Last 24 Hrs  T(C): 36.5 (06 Dec 2023 06:57), Max: 36.5 (05 Dec 2023 20:02)  T(F): 97.7 (06 Dec 2023 06:57), Max: 97.7 (05 Dec 2023 20:02)  HR: 94 (06 Dec 2023 09:48) (83 - 94)  BP: 149/71 (06 Dec 2023 06:57) (84/58 - 149/71)  BP(mean): --  RR: 20 (06 Dec 2023 09:48) (20 - 24)  SpO2: 96% (06 Dec 2023 09:48) (91% - 96%)    Parameters below as of 06 Dec 2023 09:48  Patient On (Oxygen Delivery Method): nasal cannula, high flow  O2 Flow (L/min): 50  O2 Concentration (%): 50 I&O's Summary     GENERAL: [ ]Cachexia    [ ]Alert  [ ]Oriented x   [ ]Lethargic  [x ]Unarousable  [ ]Verbal  [ ]Non-Verbal  Behavioral:   [ ]Anxiety  [ ]Delirium [ ]Agitation [ x]Other  HEENT:  [ ]Normal   [ x]Dry mouth   [ ]ET Tube/Trach  [ ]Oral lesions  PULMONARY:   [ ]Clear [ x]Tachypnea  [ ]Audible excessive secretions   [ ]Rhonchi        [ ]Right [ ]Left [ ]Bilateral  [ ]Crackles        [ ]Right [ ]Left [ ]Bilateral  [ ]Wheezing     [ ]Right [ ]Left [ ]Bilateral  [ ]Diminished BS [ ] Right [ ]Left [ ]Bilateral  CARDIOVASCULAR:    [ ]Regular [ ]Irregular [x ]Tachy  [ ]Tigre [ ]Murmur [ ]Other  GASTROINTESTINAL:  [ x]Soft  [ ]Distended   [ x]+BS  [ ]Non tender [ ]Tender  [ ]Other [ ]PEG [ ]OGT/ NGT   Last BM:   GENITOURINARY:  [ ]Normal [ x]Incontinent   [ ]Oliguria/Anuria   [ ]Anaya  MUSCULOSKELETAL:   [ ]Normal   [ ]Weakness  [ x]Bed/Wheelchair bound [ ]Edema  NEUROLOGIC:   [ ]No focal deficits  [ x] Cognitive impairment  [ ] Dysphagia [ ]Dysarthria [ ] Paresis [ ]Other   SKIN:   [x ]Normal  [ ]Rash  [ ]Other  [ ]Pressure ulcer(s) [ ]y [ ]n present on admission    CRITICAL CARE:  [ ]Shock Present  [ ]Septic [ ]Cardiogenic [ ]Neurologic [ ]Hypovolemic  [ ]Vasopressors [ ]Inotropes  [ ]Respiratory failure present [ ]Mechanical Ventilation [ ]Non-invasive ventilatory support [ ]High-Flow   [ ]Acute  [ ]Chronic [ ]Hypoxic  [ ]Hypercarbic [ ]Other  [ ]Other organ failure     LABS:                        9.8    7.30  )-----------( 211      ( 04 Dec 2023 18:19 )             30.7   12-04    134<L>  |  93<L>  |  18  ----------------------------<  148<H>  3.9   |  31  |  0.88    Ca    9.4      04 Dec 2023 18:19  Phos  3.1     12-04  Mg     2.2     12-04    TPro  6.8  /  Alb  3.4  /  TBili  0.8  /  DBili  x   /  AST  13  /  ALT  14  /  AlkPhos  94  12-04  PT/INR - ( 04 Dec 2023 18:19 )   PT: 11.5 sec;   INR: 1.10 ratio         PTT - ( 04 Dec 2023 18:19 )  PTT:28.5 sec    Urinalysis Basic - ( 04 Dec 2023 18:19 )    Color: x / Appearance: x / SG: x / pH: x  Gluc: 148 mg/dL / Ketone: x  / Bili: x / Urobili: x   Blood: x / Protein: x / Nitrite: x   Leuk Esterase: x / RBC: x / WBC x   Sq Epi: x / Non Sq Epi: x / Bacteria: x      RADIOLOGY & ADDITIONAL STUDIES:    < from: Xray Chest 1 View-PORTABLE IMMEDIATE (Xray Chest 1 View-PORTABLE IMMEDIATE .) (12.05.23 @ 10:25) >  INTERPRETATION:  EXAMINATION: XR CHEST IMMEDIATE    CLINICAL INDICATION: RRT. Shortness of breath.    TECHNIQUE: Single frontal, portable view of the chest was obtained.    COMPARISON: Chest x-ray 12/4/2023.    FINDINGS:  Pacemaker overlying the left chest wall with its leads terminating in   right atrium and right ventricle.  The heart size cannot be accurately assessed inthis projection.  Bilateral perihilar airspace opacities.  Small bilateral layering pleural effusions, greater on the left.  No pneumothorax.  No acute bony abnormality.    IMPRESSION:  Bilateral perihilar airspace opacities.  Small bilateral layering pleural effusions, left greater than right.    Findings were discussed by Dr. Gordon with Dr. Morales on 12/5/2023   9:56 AM with read back confirmation.    --- End of Report ---    < end of copied text >      Protein Calorie Malnutrition Present: [ ]mild [ ]moderate [ ]severe [ ]underweight [ ]morbid obesity  https://www.andeal.org/vault/2440/web/files/ONC/Table_Clinical%20Characteristics%20to%20Document%20Malnutrition-White%20JV%20et%20al%074988.pdf    Height (cm): 147.3 (12-05-23 @ 00:20), 147.3 (10-31-23 @ 18:21)  Weight (kg): 53.3 (12-05-23 @ 00:20), 49.6 (10-31-23 @ 18:21)  BMI (kg/m2): 24.6 (12-05-23 @ 00:20), 22.9 (10-31-23 @ 18:21)    [ ]PPSV2 < or = 30%  [ ]significant weight loss [ x]poor nutritional intake [ ]anasarca[ ]Artificial Nutrition    Other REFERRALS:  [ ]Hospice  [ ]Child Life  [ ]Social Work  [x ]Case management [ ]Holistic Therapy      SUBJECTIVE AND OBJECTIVE:  unarousable,  noted increased WOB, on hi flow   Indication for Geriatrics and Palliative Care Services/INTERVAL HPI: Goals of care/ dyspnea management    OVERNIGHT EVENTS:  no acute events pt required 4 doses of morphine in 24 hrs.    DNR on chart:DNI  DNI      Allergies    No Known Allergies    Intolerances    MEDICATIONS  (STANDING):  chlorhexidine 4% Liquid 1 Application(s) Topical daily      MEDICATIONS  (STANDING):  chlorhexidine 4% Liquid 1 Application(s) Topical daily    MEDICATIONS  (PRN):  acetaminophen     Tablet .. 650 milliGRAM(s) Oral every 6 hours PRN Temp greater or equal to 38C (100.4F), Mild Pain (1 - 3)  LORazepam   Injectable 0.5 milliGRAM(s) IV Push every 8 hours PRN Agitation  morphine  - Injectable 2 milliGRAM(s) IV Push every 3 hours PRN Moderate Pain (4 - 6)  morphine  - Injectable 2 milliGRAM(s) IV Push every 3 hours PRN dyspnea/use of accessory mucles  ondansetron Injectable 4 milliGRAM(s) IV Push every 8 hours PRN Nausea and/or Vomiting        ITEMS UNCHECKED ARE NOT PRESENT    PRESENT SYMPTOMS: [x ]Unable to self-report - see [ ] CPOT [ ] PAINADS [ ] RDOS  Source if other than patient:  [x ]Family   [ ]Team     Pain:  [ ]yes [x ]no  QOL impact -   Location -                    Aggravating factors -  Quality -  Radiation -  Timing-  Severity (0-10 scale):  Minimal acceptable level (0-10 scale):     CPOT:    https://www.[facility].org/getattachment/[email or web address]/Critical-Care-Pain-Observation-Tool-(CPOT)    PAIN AD Score:	0  http://geriatrictoolkit.[place].[facility]/cog/painad.pdf (Ctrl + left click to view)    Dyspnea:                           [ ]Mild [ ]Moderate [ ]Severe    RDOS:  0 to 2  minimal or no respiratory distress   3  mild distress  4 to 6 moderate distress  5-6  >7 severe distress  https://[email or web address]/handouts/hen/Respiratory_Distress_Observation_Scale.pdf (Ctrl +  left click to view)     Anxiety:                             [ ]Mild [ ]Moderate [ ]Severe  Fatigue:                             [ ]Mild [ ]Moderate [x ]Severe  Nausea:                             [ ]Mild [ ]Moderate [ ]Severe  Loss of appetite:              [ ]Mild [ ]Moderate [x ]Severe  Constipation:                    [ ]Mild [ ]Moderate [ ]Severe    PCSSQ[Palliative Care Spiritual Screening Question]   Severity (0-10):5  Score of 4 or > indicate consideration of Chaplaincy referral.  Chaplaincy Referral: [ ] yes [ ] refused [ ] following xx deferred    Caregiver [place]? : [ x] yes [ ] no  Social work referral [ ] Patient & Family Centered Care Referral [ ]     Anticipatory Grief present?:  [x ] yes [ ] no  Social work referral [ ] Patient & Family Centered Care Referral [ ]      Other Symptoms:  [ x]All other review of systems negative     Palliative Performance Status Version 2:  10       %      http://npcrc.org/files/news/palliative_performance_scale_ppsv2.pdf  PHYSICAL EXAM:  Vital Signs Last 24 Hrs  T(C): 36.5 (06 Dec 2023 06:57), Max: 36.5 (05 Dec 2023 20:02)  T(F): 97.7 (06 Dec 2023 06:57), Max: 97.7 (05 Dec 2023 20:02)  HR: 94 (06 Dec 2023 09:48) (83 - 94)  BP: 149/71 (06 Dec 2023 06:57) (84/58 - 149/71)  BP(mean): --  RR: 20 (06 Dec 2023 09:48) (20 - 24)  SpO2: 96% (06 Dec 2023 09:48) (91% - 96%)    Parameters below as of 06 Dec 2023 09:48  Patient On (Oxygen Delivery Method): nasal cannula, high flow  O2 Flow (L/min): 50  O2 Concentration (%): 50 I&O's Summary     GENERAL: [ ]Cachexia    [ ]Alert  [ ]Oriented x   [ ]Lethargic  [x ]Unarousable  [ ]Verbal  [ ]Non-Verbal  Behavioral:   [ ]Anxiety  [ ]Delirium [ ]Agitation [ x]Other  HEENT:  [ ]Normal   [ x]Dry mouth   [ ]ET Tube/Trach  [ ]Oral lesions  PULMONARY:   [ ]Clear [ x]Tachypnea  [ ]Audible excessive secretions   [ ]Rhonchi        [ ]Right [ ]Left [ ]Bilateral  [ ]Crackles        [ ]Right [ ]Left [ ]Bilateral  [ ]Wheezing     [ ]Right [ ]Left [ ]Bilateral  [ ]Diminished BS [ ] Right [ ]Left [ ]Bilateral  CARDIOVASCULAR:    [ ]Regular [ ]Irregular [x ]Tachy  [ ]Tigre [ ]Murmur [ ]Other  GASTROINTESTINAL:  [ x]Soft  [ ]Distended   [ x]+BS  [ ]Non tender [ ]Tender  [ ]Other [ ]PEG [ ]OGT/ NGT   Last BM:   GENITOURINARY:  [ ]Normal [ x]Incontinent   [ ]Oliguria/Anuria   [ ]Anaya  MUSCULOSKELETAL:   [ ]Normal   [ ]Weakness  [ x]Bed/Wheelchair bound [ ]Edema  NEUROLOGIC:   [ ]No focal deficits  [ x] Cognitive impairment  [ ] Dysphagia [ ]Dysarthria [ ] Paresis [ ]Other   SKIN:   [x ]Normal  [ ]Rash  [ ]Other  [ ]Pressure ulcer(s) [ ]y [ ]n present on admission    CRITICAL CARE:  [ ]Shock Present  [ ]Septic [ ]Cardiogenic [ ]Neurologic [ ]Hypovolemic  [ ]Vasopressors [ ]Inotropes  [ ]Respiratory failure present [ ]Mechanical Ventilation [ ]Non-invasive ventilatory support [ ]High-Flow   [ ]Acute  [ ]Chronic [ ]Hypoxic  [ ]Hypercarbic [ ]Other  [ ]Other organ failure     LABS:                        9.8    7.30  )-----------( 211      ( 04 Dec 2023 18:19 )             30.7   12-04    134<L>  |  93<L>  |  18  ----------------------------<  148<H>  3.9   |  31  |  0.88    Ca    9.4      04 Dec 2023 18:19  Phos  3.1     12-04  Mg     2.2     12-04    TPro  6.8  /  Alb  3.4  /  TBili  0.8  /  DBili  x   /  AST  13  /  ALT  14  /  AlkPhos  94  12-04  PT/INR - ( 04 Dec 2023 18:19 )   PT: 11.5 sec;   INR: 1.10 ratio         PTT - ( 04 Dec 2023 18:19 )  PTT:28.5 sec    Urinalysis Basic - ( 04 Dec 2023 18:19 )    Color: x / Appearance: x / SG: x / pH: x  Gluc: 148 mg/dL / Ketone: x  / Bili: x / Urobili: x   Blood: x / Protein: x / Nitrite: x   Leuk Esterase: x / RBC: x / WBC x   Sq Epi: x / Non Sq Epi: x / Bacteria: x      RADIOLOGY & ADDITIONAL STUDIES:    < from: Xray Chest 1 View-PORTABLE IMMEDIATE (Xray Chest 1 View-PORTABLE IMMEDIATE .) (12.05.23 @ 10:25) >  INTERPRETATION:  EXAMINATION: XR CHEST IMMEDIATE    CLINICAL INDICATION: RRT. Shortness of breath.    TECHNIQUE: Single frontal, portable view of the chest was obtained.    COMPARISON: Chest x-ray 12/4/2023.    FINDINGS:  Pacemaker overlying the left chest wall with its leads terminating in   right atrium and right ventricle.  The heart size cannot be accurately assessed inthis projection.  Bilateral perihilar airspace opacities.  Small bilateral layering pleural effusions, greater on the left.  No pneumothorax.  No acute bony abnormality.    IMPRESSION:  Bilateral perihilar airspace opacities.  Small bilateral layering pleural effusions, left greater than right.    Findings were discussed by Dr. Gordon with Dr. Morales on 12/5/2023   9:56 AM with read back confirmation.    --- End of Report ---    < end of copied text >      Protein Calorie Malnutrition Present: [ ]mild [ ]moderate [ ]severe [ ]underweight [ ]morbid obesity  https://www.andeal.org/vault/2440/web/files/ONC/Table_Clinical%20Characteristics%20to%20Document%20Malnutrition-White%20JV%20et%20al%315630.pdf    Height (cm): 147.3 (12-05-23 @ 00:20), 147.3 (10-31-23 @ 18:21)  Weight (kg): 53.3 (12-05-23 @ 00:20), 49.6 (10-31-23 @ 18:21)  BMI (kg/m2): 24.6 (12-05-23 @ 00:20), 22.9 (10-31-23 @ 18:21)    [ ]PPSV2 < or = 30%  [ ]significant weight loss [ x]poor nutritional intake [ ]anasarca[ ]Artificial Nutrition    Other REFERRALS:  [ ]Hospice  [ ]Child Life  [ ]Social Work  [x ]Case management [ ]Holistic Therapy      SUBJECTIVE AND OBJECTIVE:  unarousable,  noted increased WOB, on hi flow   Indication for Geriatrics and Palliative Care Services/INTERVAL HPI: Goals of care/ dyspnea management    OVERNIGHT EVENTS:  no acute events pt required 4 doses of morphine in 24 hrs.    DNR on chart:DNI  DNI      Allergies    No Known Allergies    Intolerances    MEDICATIONS  (STANDING):  chlorhexidine 4% Liquid 1 Application(s) Topical daily      MEDICATIONS  (STANDING):  chlorhexidine 4% Liquid 1 Application(s) Topical daily    MEDICATIONS  (PRN):  acetaminophen     Tablet .. 650 milliGRAM(s) Oral every 6 hours PRN Temp greater or equal to 38C (100.4F), Mild Pain (1 - 3)  LORazepam   Injectable 0.5 milliGRAM(s) IV Push every 8 hours PRN Agitation  morphine  - Injectable 2 milliGRAM(s) IV Push every 3 hours PRN Moderate Pain (4 - 6)  morphine  - Injectable 2 milliGRAM(s) IV Push every 3 hours PRN dyspnea/use of accessory mucles  ondansetron Injectable 4 milliGRAM(s) IV Push every 8 hours PRN Nausea and/or Vomiting        ITEMS UNCHECKED ARE NOT PRESENT    PRESENT SYMPTOMS: [x ]Unable to self-report - see [ ] CPOT [ ] PAINADS [ ] RDOS  Source if other than patient:  [x ]Family   [ ]Team     Pain:  [ ]yes [x ]no  QOL impact -   Location -                    Aggravating factors -  Quality -  Radiation -  Timing-  Severity (0-10 scale):  Minimal acceptable level (0-10 scale):     CPOT:    https://www.[facility].org/getattachment/[email or web address]/Critical-Care-Pain-Observation-Tool-(CPOT)    PAIN AD Score:	0  http://geriatrictoolkit.[place].[facility]/cog/painad.pdf (Ctrl + left click to view)    Dyspnea:                           [ ]Mild [ ]Moderate [ ]Severe    RDOS:  0 to 2  minimal or no respiratory distress   3  mild distress  4 to 6 moderate distress  5-6  >7 severe distress  https://[email or web address]/handouts/hen/Respiratory_Distress_Observation_Scale.pdf (Ctrl +  left click to view)     Anxiety:                             [ ]Mild [ ]Moderate [ ]Severe  Fatigue:                             [ ]Mild [ ]Moderate [x ]Severe  Nausea:                             [ ]Mild [ ]Moderate [ ]Severe  Loss of appetite:              [ ]Mild [ ]Moderate [x ]Severe  Constipation:                    [ ]Mild [ ]Moderate [ ]Severe    PCSSQ[Palliative Care Spiritual Screening Question]   Severity (0-10):5  Score of 4 or > indicate consideration of Chaplaincy referral.  Chaplaincy Referral: [ ] yes [ ] refused [ ] following xx deferred    Caregiver [place]? : [ x] yes [ ] no  Social work referral [ ] Patient & Family Centered Care Referral [ ]     Anticipatory Grief present?:  [x ] yes [ ] no  Social work referral [ ] Patient & Family Centered Care Referral [ ]      Other Symptoms:  [ x]All other review of systems negative     Palliative Performance Status Version 2:  10       %      http://npcrc.org/files/news/palliative_performance_scale_ppsv2.pdf  PHYSICAL EXAM:  Vital Signs Last 24 Hrs  T(C): --  T(F): --  HR: 99 (07 Dec 2023 09:41) (87 - 100)  BP: --  BP(mean): --  RR: 20 (07 Dec 2023 09:41) (20 - 24)  SpO2: 91% (07 Dec 2023 09:41) (90% - 97%)    Parameters below as of 07 Dec 2023 09:41  Patient On (Oxygen Delivery Method): nasal cannula, high flow  O2 Flow (L/min): 50  O2 Concentration (%): 75)    Parameters below as of 06 Dec 2023 09:48  Patient On (Oxygen Delivery Method): nasal cannula, high flow  O2 Flow (L/min): 50  O2 Concentration (%): 50 I&O's Summary     GENERAL: [ ]Cachexia    [ ]Alert  [ ]Oriented x   [x ]Lethargic  [x ]Unarousable  [ ]Verbal  [ x]Non-Verbal  Behavioral:   [ ]Anxiety  [ ]Delirium [x ]mild Agitation [ x]Other  HEENT:  [ ]Normal   [ x]Dry mouth   [ ]ET Tube/Trach  [ ]Oral lesions  PULMONARY:   [ ]Clear [ x]Tachypnea  [ ]Audible excessive secretions   [ ]Rhonchi        [ ]Right [ ]Left [ ]Bilateral  [ ]Crackles        [ ]Right [ ]Left [ ]Bilateral  [ ]Wheezing     [ ]Right [ ]Left [ ]Bilateral  [ ]Diminished BS [ ] Right [ ]Left [ ]Bilateral  CARDIOVASCULAR:    [ ]Regular [ ]Irregular [x ]Tachy  [ ]Tigre [ ]Murmur [ ]Other  GASTROINTESTINAL:  [ x]Soft  [ ]Distended   [ x]+BS  [ ]Non tender [ ]Tender  [ ]Other [ ]PEG [ ]OGT/ NGT   Last BM:   GENITOURINARY:  [ ]Normal [ x]Incontinent   [ ]Oliguria/Anuria   [ ]Anaya  MUSCULOSKELETAL:   [ ]Normal   [ ]Weakness  [ x]Bed/Wheelchair bound [ ]Edema  NEUROLOGIC:   [ ]No focal deficits  [ x] Cognitive impairment  [ ] Dysphagia [ ]Dysarthria [ ] Paresis [ ]Other   SKIN:   [x ]Normal  [ ]Rash  [ ]Other  [ ]Pressure ulcer(s) [ ]y [ ]n present on admission    CRITICAL CARE:  [ ]Shock Present  [ ]Septic [ ]Cardiogenic [ ]Neurologic [ ]Hypovolemic  [ ]Vasopressors [ ]Inotropes  [x ]Respiratory failure present [ ]Mechanical Ventilation [ ]Non-invasive ventilatory support [x ]High-Flow   [ ]Acute  [ ]Chronic [ ]Hypoxic  [ ]Hypercarbic [ ]Other  [ ]Other organ failure     LABS: reveiwed                        9.8    7.30  )-----------( 211      ( 04 Dec 2023 18:19 )             30.7   12-04    134<L>  |  93<L>  |  18  ----------------------------<  148<H>  3.9   |  31  |  0.88    Ca    9.4      04 Dec 2023 18:19  Phos  3.1     12-04  Mg     2.2     12-04    TPro  6.8  /  Alb  3.4  /  TBili  0.8  /  DBili  x   /  AST  13  /  ALT  14  /  AlkPhos  94  12-04  PT/INR - ( 04 Dec 2023 18:19 )   PT: 11.5 sec;   INR: 1.10 ratio         PTT - ( 04 Dec 2023 18:19 )  PTT:28.5 sec    Urinalysis Basic - ( 04 Dec 2023 18:19 )    Color: x / Appearance: x / SG: x / pH: x  Gluc: 148 mg/dL / Ketone: x  / Bili: x / Urobili: x   Blood: x / Protein: x / Nitrite: x   Leuk Esterase: x / RBC: x / WBC x   Sq Epi: x / Non Sq Epi: x / Bacteria: x      RADIOLOGY & ADDITIONAL STUDIES:    < from: Xray Chest 1 View-PORTABLE IMMEDIATE (Xray Chest 1 View-PORTABLE IMMEDIATE .) (12.05.23 @ 10:25) >  INTERPRETATION:  EXAMINATION: XR CHEST IMMEDIATE    CLINICAL INDICATION: RRT. Shortness of breath.    TECHNIQUE: Single frontal, portable view of the chest was obtained.    COMPARISON: Chest x-ray 12/4/2023.    FINDINGS:  Pacemaker overlying the left chest wall with its leads terminating in   right atrium and right ventricle.  The heart size cannot be accurately assessed inthis projection.  Bilateral perihilar airspace opacities.  Small bilateral layering pleural effusions, greater on the left.  No pneumothorax.  No acute bony abnormality.    IMPRESSION:  Bilateral perihilar airspace opacities.  Small bilateral layering pleural effusions, left greater than right.    Findings were discussed by Dr. Gordon with Dr. Morales on 12/5/2023   9:56 AM with read back confirmation.    --- End of Report ---    < end of copied text >      Protein Calorie Malnutrition Present: [ ]mild [ ]moderate [ ]severe [ ]underweight [ ]morbid obesity  https://www.andeal.org/vault/2440/web/files/ONC/Table_Clinical%20Characteristics%20to%20Document%20Malnutrition-White%20JV%20et%20al%202012.pdf    Height (cm): 147.3 (12-05-23 @ 00:20), 147.3 (10-31-23 @ 18:21)  Weight (kg): 53.3 (12-05-23 @ 00:20), 49.6 (10-31-23 @ 18:21)  BMI (kg/m2): 24.6 (12-05-23 @ 00:20), 22.9 (10-31-23 @ 18:21)    [ ]PPSV2 < or = 30%  [ ]significant weight loss [ x]poor nutritional intake [ ]anasarca[ ]Artificial Nutrition    Other REFERRALS:  [ ]Hospice  [ ]Child Life  [ ]Social Work  [x ]Case management [ ]Holistic Therapy      SUBJECTIVE AND OBJECTIVE:  unarousable,  noted increased WOB, on hi flow   Indication for Geriatrics and Palliative Care Services/INTERVAL HPI: Goals of care/ dyspnea management    OVERNIGHT EVENTS:  no acute events pt required 4 doses of morphine in 24 hrs.    DNR on chart:DNI  DNI      Allergies    No Known Allergies    Intolerances    MEDICATIONS  (STANDING):  chlorhexidine 4% Liquid 1 Application(s) Topical daily      MEDICATIONS  (STANDING):  chlorhexidine 4% Liquid 1 Application(s) Topical daily    MEDICATIONS  (PRN):  acetaminophen     Tablet .. 650 milliGRAM(s) Oral every 6 hours PRN Temp greater or equal to 38C (100.4F), Mild Pain (1 - 3)  LORazepam   Injectable 0.5 milliGRAM(s) IV Push every 8 hours PRN Agitation  morphine  - Injectable 2 milliGRAM(s) IV Push every 3 hours PRN Moderate Pain (4 - 6)  morphine  - Injectable 2 milliGRAM(s) IV Push every 3 hours PRN dyspnea/use of accessory mucles  ondansetron Injectable 4 milliGRAM(s) IV Push every 8 hours PRN Nausea and/or Vomiting        ITEMS UNCHECKED ARE NOT PRESENT    PRESENT SYMPTOMS: [x ]Unable to self-report - see [ ] CPOT [ ] PAINADS [ ] RDOS  Source if other than patient:  [x ]Family   [ ]Team     Pain:  [ ]yes [x ]no  QOL impact -   Location -                    Aggravating factors -  Quality -  Radiation -  Timing-  Severity (0-10 scale):  Minimal acceptable level (0-10 scale):     CPOT:    https://www.[facility].org/getattachment/[email or web address]/Critical-Care-Pain-Observation-Tool-(CPOT)    PAIN AD Score:	0  http://geriatrictoolkit.[place].[facility]/cog/painad.pdf (Ctrl + left click to view)    Dyspnea:                           [ ]Mild [ ]Moderate [ ]Severe    RDOS:  0 to 2  minimal or no respiratory distress   3  mild distress  4 to 6 moderate distress  5-6  >7 severe distress  https://[email or web address]/handouts/hen/Respiratory_Distress_Observation_Scale.pdf (Ctrl +  left click to view)     Anxiety:                             [ ]Mild [ ]Moderate [ ]Severe  Fatigue:                             [ ]Mild [ ]Moderate [x ]Severe  Nausea:                             [ ]Mild [ ]Moderate [ ]Severe  Loss of appetite:              [ ]Mild [ ]Moderate [x ]Severe  Constipation:                    [ ]Mild [ ]Moderate [ ]Severe    PCSSQ[Palliative Care Spiritual Screening Question]   Severity (0-10):5  Score of 4 or > indicate consideration of Chaplaincy referral.  Chaplaincy Referral: [ ] yes [ ] refused [ ] following xx deferred    Caregiver [place]? : [ x] yes [ ] no  Social work referral [ ] Patient & Family Centered Care Referral [ ]     Anticipatory Grief present?:  [x ] yes [ ] no  Social work referral [ ] Patient & Family Centered Care Referral [ ]      Other Symptoms:  [ x]All other review of systems negative     Palliative Performance Status Version 2:  10       %      http://npcrc.org/files/news/palliative_performance_scale_ppsv2.pdf  PHYSICAL EXAM:  Vital Signs Last 24 Hrs  T(C): --  T(F): --  HR: 99 (07 Dec 2023 09:41) (87 - 100)  BP: --  BP(mean): --  RR: 20 (07 Dec 2023 09:41) (20 - 24)  SpO2: 91% (07 Dec 2023 09:41) (90% - 97%)    Parameters below as of 07 Dec 2023 09:41  Patient On (Oxygen Delivery Method): nasal cannula, high flow  O2 Flow (L/min): 50  O2 Concentration (%): 75)    Parameters below as of 06 Dec 2023 09:48  Patient On (Oxygen Delivery Method): nasal cannula, high flow  O2 Flow (L/min): 50  O2 Concentration (%): 50 I&O's Summary     GENERAL: [ ]Cachexia    [ ]Alert  [ ]Oriented x   [x ]Lethargic  [x ]Unarousable  [ ]Verbal  [ x]Non-Verbal  Behavioral:   [ ]Anxiety  [ ]Delirium [x ]mild Agitation [ x]Other  HEENT:  [ ]Normal   [ x]Dry mouth   [ ]ET Tube/Trach  [ ]Oral lesions  PULMONARY:   [ ]Clear [ x]Tachypnea  [ ]Audible excessive secretions   [ ]Rhonchi        [ ]Right [ ]Left [ ]Bilateral  [ ]Crackles        [ ]Right [ ]Left [ ]Bilateral  [ ]Wheezing     [ ]Right [ ]Left [ ]Bilateral  [ ]Diminished BS [ ] Right [ ]Left [ ]Bilateral  CARDIOVASCULAR:    [ ]Regular [ ]Irregular [x ]Tachy  [ ]Tigre [ ]Murmur [ ]Other  GASTROINTESTINAL:  [ x]Soft  [ ]Distended   [ x]+BS  [ ]Non tender [ ]Tender  [ ]Other [ ]PEG [ ]OGT/ NGT   Last BM:   GENITOURINARY:  [ ]Normal [ x]Incontinent   [ ]Oliguria/Anuria   [ ]Anaya  MUSCULOSKELETAL:   [ ]Normal   [ ]Weakness  [ x]Bed/Wheelchair bound [ ]Edema  NEUROLOGIC:   [ ]No focal deficits  [ x] Cognitive impairment  [ ] Dysphagia [ ]Dysarthria [ ] Paresis [ ]Other   SKIN:   [x ]Normal  [ ]Rash  [ ]Other  [ ]Pressure ulcer(s) [ ]y [ ]n present on admission    CRITICAL CARE:  [ ]Shock Present  [ ]Septic [ ]Cardiogenic [ ]Neurologic [ ]Hypovolemic  [ ]Vasopressors [ ]Inotropes  [x ]Respiratory failure present [ ]Mechanical Ventilation [ ]Non-invasive ventilatory support [x ]High-Flow   [ ]Acute  [ ]Chronic [ ]Hypoxic  [ ]Hypercarbic [ ]Other  [ ]Other organ failure     LABS: reveiwed                        9.8    7.30  )-----------( 211      ( 04 Dec 2023 18:19 )             30.7   12-04    134<L>  |  93<L>  |  18  ----------------------------<  148<H>  3.9   |  31  |  0.88    Ca    9.4      04 Dec 2023 18:19  Phos  3.1     12-04  Mg     2.2     12-04    TPro  6.8  /  Alb  3.4  /  TBili  0.8  /  DBili  x   /  AST  13  /  ALT  14  /  AlkPhos  94  12-04  PT/INR - ( 04 Dec 2023 18:19 )   PT: 11.5 sec;   INR: 1.10 ratio         PTT - ( 04 Dec 2023 18:19 )  PTT:28.5 sec    Urinalysis Basic - ( 04 Dec 2023 18:19 )    Color: x / Appearance: x / SG: x / pH: x  Gluc: 148 mg/dL / Ketone: x  / Bili: x / Urobili: x   Blood: x / Protein: x / Nitrite: x   Leuk Esterase: x / RBC: x / WBC x   Sq Epi: x / Non Sq Epi: x / Bacteria: x      RADIOLOGY & ADDITIONAL STUDIES:    < from: Xray Chest 1 View-PORTABLE IMMEDIATE (Xray Chest 1 View-PORTABLE IMMEDIATE .) (12.05.23 @ 10:25) >  INTERPRETATION:  EXAMINATION: XR CHEST IMMEDIATE    CLINICAL INDICATION: RRT. Shortness of breath.    TECHNIQUE: Single frontal, portable view of the chest was obtained.    COMPARISON: Chest x-ray 12/4/2023.    FINDINGS:  Pacemaker overlying the left chest wall with its leads terminating in   right atrium and right ventricle.  The heart size cannot be accurately assessed inthis projection.  Bilateral perihilar airspace opacities.  Small bilateral layering pleural effusions, greater on the left.  No pneumothorax.  No acute bony abnormality.    IMPRESSION:  Bilateral perihilar airspace opacities.  Small bilateral layering pleural effusions, left greater than right.    Findings were discussed by Dr. Gordon with Dr. Morales on 12/5/2023   9:56 AM with read back confirmation.    --- End of Report ---    < end of copied text >      Protein Calorie Malnutrition Present: [ ]mild [ ]moderate [ ]severe [ ]underweight [ ]morbid obesity  https://www.andeal.org/vault/2440/web/files/ONC/Table_Clinical%20Characteristics%20to%20Document%20Malnutrition-White%20JV%20et%20al%202012.pdf    Height (cm): 147.3 (12-05-23 @ 00:20), 147.3 (10-31-23 @ 18:21)  Weight (kg): 53.3 (12-05-23 @ 00:20), 49.6 (10-31-23 @ 18:21)  BMI (kg/m2): 24.6 (12-05-23 @ 00:20), 22.9 (10-31-23 @ 18:21)    [ ]PPSV2 < or = 30%  [ ]significant weight loss [ x]poor nutritional intake [ ]anasarca[ ]Artificial Nutrition    Other REFERRALS:  [ ]Hospice  [ ]Child Life  [ ]Social Work  [x ]Case management [ ]Holistic Therapy

## 2023-12-07 NOTE — DIETITIAN INITIAL EVALUATION ADULT - NS FNS REASON FOR WEIGHT CHANG
Per pt's Granddaughter-in-Law, pt has recent weight gain in setting of fluid shifts (pt noted to have CHF - per chart)./fluid retention

## 2023-12-07 NOTE — DIETITIAN INITIAL EVALUATION ADULT - REASON
YOU MAY NOW SIGN UP FOR MY [employer] WE[employer] ACCOUNT TO OBTAIN LAB AND TEST RESULTS.  SOME MAY TAKE UP TO 2 WEEKS TO BE ENTERED INTO [employer]  WHILE OTHERS MAY BE ENTERED IMMEDIATELY.  YOU WILL ALWAYS RECEIVE A PHONE CALL FROM OUR OFFICE STAFF WITH IMPORTANT ABNORMAL LABS.  HOWEVER, YOU WILL NOT RECEIVE A CALL ABOUT NORMAL LAB RESULTS. PLEASE FEEL FREE TO CALL AND DISCUSS ANY LAB RESULTS YOU HAVE QUESTIONS ABOUT.     .PLEASE CHECK WITH YOUR INSURANCE FOR ANY REFERRALS TO BE SURE THEY ARE IN YOUR INSURANCE PLAN.     DO EKG TODAY    SCHEDULE THYROID US:  [phone]    .SCHEDULE HOME SLEEP STUDY:  [phone]     DO FASTING CBC, CMP, VIT D, TSH, FT4 & UA.    DO FBS & HGB A1C IN MAY. .FAST 10-12 HOURS.  MAY DRINK WATER.    .LAB INSTRUCTIONS:  FAST 10-12 HOURS.  MAY DRINK WATER.  FOR URINE SAMPLE ASK FOR 3 WIPES TO WIPE 3 TIMES FRONT TO BACK.    LAB HOURS  Monday-Friday 7AM-5PM  Saturday 7AM-12PM  Sunday CLOSED    .HOLD ALL OVER THE COUNTER SUPPLEMENTS, VITAMINS & NSAIDS INCLUDINING: ASPIRIN, MOTRIN, ALIEVE, IBPURPOFEN 1 WEEK PRIOR TO SURGERY.    DO NOT TAKE SUDAFED 1 WEEK PRE-OP  DO NOT TAKE IN ANY CAFFEINE 1 WEEK PRE-OP    IT IS OK TO TAKE TYLENOL.    OK TO RESUME ALL MEDICATION AFTER SURGERY UNLESS ADVISED OTHERWISE BY YOUR SURGEON.    .SCHEDULE APPOINTMENT WITH CARDIOLOGIST-DR. SPENCE:  [phone]  --TACHYCARDIA  --PRE-OP CLEARANCE  OR  SCHEDULE APPOINTMENT WITH ELECTROPHYSIOLOGIST: DR SHI CHAND  --TACHYCARDIA  --PRE-OP CLEARANCE    REVIEW SNORING WITH ANESTHESIOLOGIST    SCHEDULE FOLLOW UP 1 MONTH POST OP.        In the next few weeks you may receive a Press Ganey survey regarding your most recent clinic visit with us.  Please take a few moments to accurately evaluate your visit. We strive to provide you with the best medical care. Your feedback will assist us in achieving this. Again, thank you for your time and we look forward to your next visit.         If we need to contact you regarding any test results, we will make 2  attempts to reach you at the number you have listed during your office visit today. If we are unable to reach you, a letter with your results and any further instructions will be mailed to you home.                         Nutrition focused physical exam inappropriate at this time, pt is comfort measures only (per chart).

## 2023-12-07 NOTE — PROGRESS NOTE ADULT - PROBLEM SELECTOR PROBLEM 6
Prophylactic measure
Advanced care planning/counseling discussion
Prophylactic measure
Prophylactic measure
Advanced care planning/counseling discussion

## 2023-12-07 NOTE — DIETITIAN INITIAL EVALUATION ADULT - REASON INDICATOR FOR ASSESSMENT
RD consult warranted for MST Score 2 or >  Source: Patient's Granddaughter-in-Law (Maria Fernanda), Electronic Medical Record  Chart reviewed, events noted.

## 2023-12-07 NOTE — DIETITIAN INITIAL EVALUATION ADULT - PROBLEM SELECTOR PLAN 5
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    - Interrogate pacemaker  - Continue to trend vitals

## 2023-12-07 NOTE — DIETITIAN INITIAL EVALUATION ADULT - PROBLEM SELECTOR PLAN 2
The patient presenting in respiratory failure with hypoxia from her outpatient medical visit. Patient O2 sat was at 85% on 3L NC and has required increased O2 requirements during hospitalization. Hypoxia is likely secondary to pulmonary edema from acute heart failure.    - Treat heart failure as above  - Monitor O2 sat  - LE doppler pending

## 2023-12-07 NOTE — PROGRESS NOTE ADULT - ASSESSMENT
The patient is a 89 yo woman with a history of dementia, HTN, HLD, bradycardia s/p PPM, and CHF presenting with hypoxia. On the afternoon of 12/4, the patient arrived to an intake appointment for her cardiologist and was found to have an O2 saturation of 85%, for which she was recommended to report directly to the hospital.  S/p RRT and now on hi flow.  Palliative care called for GOC.  The patient is a 89 yo woman with a history of dementia, HTN, HLD, bradycardia s/p PPM, and CHF presenting with hypoxia. On the afternoon of 12/4, the patient arrived to an intake appointment for her cardiologist and was found to have an O2 saturation of 85%, for which she was recommended to report directly to the hospital.  S/p RRT and now on hi flow.  Palliative care called for GOC.  The patient is a 89 yo woman with a history of dementia, HTN, HLD, bradycardia s/p PPM, and CHF presenting with hypoxia. On the afternoon of 12/4, the patient arrived to an intake appointment for her cardiologist and was found to have an O2 saturation of 85%, for which she was recommended to report directly to the hospital.  S/p RRT and now on hi flow.  Palliative care called for GOC now following for symptom management.

## 2023-12-07 NOTE — DIETITIAN INITIAL EVALUATION ADULT - OTHER INFO
Weights:  - UBW (per patient's Granddaughter-in-Law): 100 pounds  - Dosing Weight (per chart): 117.5 pounds (12/5)  - Daily Weights (per flow sheets): 114.6 pounds (12/6)  - Per [facility] HIE: 115.1 pounds (10/30), 118 pounds (1/31/2020)  Per pt's Granddaughter-in-Law, pt has experienced recent weight gain secondary to fluid shifts (pt with CHF). Pt noted to have 1+ edema (per flow sheets). RD to continue to monitor weights and trends as able.     Rapid Response Team called on 12/5 for "Abnormal respiratory rate."  Per chart, "pt made full comfort 12/5."  Palliative care following, per chart "Awaits bed in PCU."     Orders:  - Pt ordered for Morphine (per orders).  Weights:  - UBW (per patient's Granddaughter-in-Law): 100 pounds  - Dosing Weight (per chart): 117.5 pounds (12/5)  - Daily Weights (per flow sheets): 114.6 pounds (12/6)  - Per [facility] HIE: 115.1 pounds (10/30), 118 pounds (1/31/2020)  Per pt's Granddaughter-in-Law, pt has experienced recent weight gain secondary to fluid shifts (pt with CHF). Pt noted to have 1+ edema (per flow sheets). RD to continue to monitor weights and trends as able.     Rapid Response Team called on 12/5 for "Abnormal respiratory rate."  Per chart, "pt made full comfort 12/5."  Palliative care following, per chart "Awaits bed in PCU."     Orders:  - Pt ordered for Morphine (per orders).

## 2023-12-07 NOTE — DIETITIAN INITIAL EVALUATION ADULT - NSFNSNUTRCHEWSWALLOWFT_GEN_A_CORE
Pt's Granddaughter-in-Law stated the pt followed a "soft" diet at home prior to admission. Pt ordered for pleasure feeds in-house. Per Palliative note today, 12/7 "Pleasure feeds only while awake. Family aware of risk of aspiration and even death." Diet to be in line with medical GOC and pt/family wishes. Defer diet texture/consistency to Medical Team.

## 2023-12-07 NOTE — DIETITIAN INITIAL EVALUATION ADULT - PROBLEM SELECTOR PLAN 4
Per patient family, endorsed a progressive decline in vision and concerned that this loss of vision has led to the patient growing more confused and disoriented than baseline.    - Head CT  - Consider Ophthalmology consult

## 2023-12-07 NOTE — PROGRESS NOTE ADULT - PROBLEM SELECTOR PLAN 6
Consultants: Palliative  DVT ppx: n/a  Diet: pleasure feeds  Dispo: pending PCU bed  Directive: DNR/DNI, full comfort

## 2023-12-07 NOTE — DIETITIAN INITIAL EVALUATION ADULT - NSFNSGIIOFT_GEN_A_CORE
No GI distress noted in flow sheets. Pt noted to have fecal incontinence (per flow sheets).  - Last BM: No recent bowel movements noted in-house (per flow sheets).   - Bowel Regimen: Pt is not ordered for a bowel regimen (per orders). Pt is comfort measures only (per chart).     Note: Pt ordered for Zofran (per orders).

## 2023-12-07 NOTE — DIETITIAN INITIAL EVALUATION ADULT - PERTINENT MEDS FT
MEDICATIONS  (STANDING):  chlorhexidine 4% Liquid 1 Application(s) Topical daily  morphine  - Injectable 2 milliGRAM(s) IV Push every 6 hours    MEDICATIONS  (PRN):  acetaminophen     Tablet .. 650 milliGRAM(s) Oral every 6 hours PRN Temp greater or equal to 38C (100.4F), Mild Pain (1 - 3)  LORazepam   Injectable 0.5 milliGRAM(s) IV Push every 8 hours PRN Agitation  morphine  - Injectable 2 milliGRAM(s) IV Push every 2 hours PRN dyspnea/use of accessory mucles  morphine  - Injectable 2 milliGRAM(s) IV Push every 2 hours PRN Moderate Pain (4 - 6)  ondansetron Injectable 4 milliGRAM(s) IV Push every 8 hours PRN Nausea and/or Vomiting

## 2023-12-07 NOTE — DIETITIAN INITIAL EVALUATION ADULT - PROBLEM SELECTOR PROBLEM 4
Poor vision Niacinamide Pregnancy And Lactation Text: These medications are considered safe during pregnancy.

## 2023-12-07 NOTE — CHART NOTE - NSCHARTNOTEFT_GEN_A_CORE
Dietitian Quick Note:     Pt noted to be on STAR list for Heart Failure. STAR education not appropriate, as pt was made "full comfort 12/5" (per chart). Per chart, further "medical intervention to focus on comfort and quality of life. Nutrition plan of care to be in line with medical GOC and pt/family wishes.    RD remains available upon request and will follow up as able per protocol.   Eva Mcdaniel, NICN, CDN  TEAMS Dietitian Quick Note:     Pt noted to be on STAR list for Heart Failure. STAR education not appropriate, as pt was made "full comfort 12/5" (per chart). Per chart, further "medical intervention to focus on comfort and quality of life." Nutrition plan of care to be in line with medical GOC and pt/family wishes.    RD remains available upon request and will follow up as able per protocol.   Eva Mcdaniel, NICN, CDN  TEAMS

## 2023-12-07 NOTE — PROGRESS NOTE ADULT - ATTENDING COMMENTS
89 year old female with PMH HTN, HLD, bradycardia s/p PPM and heart failure with reduced ejection fraction sent in by her cardiologist for acute respiratory failure with hypoxia due to decompensated congestive heart failure. The patient is DNR/DNI and was a rapid response earlier today due to respiratory distress and hypoxia. Due to the patient's poor prognosis, she has been made comfort care by her family. Palliative care has been consulted, will discuss possible transfer to PCU. No further lab draws or fingersticks. Will ensure patient has morphine for air hunger. Rest of plan as above.
89 year old female with PMH HTN, HLD, bradycardia s/p PPM and heart failure with reduced ejection fraction sent in by her cardiologist for acute respiratory failure with hypoxia due to decompensated congestive heart failure. The patient is DNR/DNI and comfort care. Palliative care has been consulted, patient is for transfer to PCU when bed becomes available. Rest of plan as above.
89 year old female with PMH HTN, HLD, bradycardia s/p PPM and heart failure with reduced ejection fraction sent in by her cardiologist for acute respiratory failure with hypoxia due to decompensated congestive heart failure. The patient is DNR/DNI and comfort care. Palliative care has been consulted, patient is for transfer to PCU when bed becomes available. Rest of plan as above.

## 2023-12-07 NOTE — DIETITIAN INITIAL EVALUATION ADULT - FACTORS AFF FOOD INTAKE
Pt noted to be ordered for pleasure feeds (per chart)./change in mental status/difficulty feeding self

## 2023-12-07 NOTE — DIETITIAN INITIAL EVALUATION ADULT - NSFNSNUTRHOMESUPPLEMENTFT_GEN_A_CORE
Pt's Granddaughter-in-Law stated the pt did not take any micronutrient supplements PTA, as well as oral nutrition supplements.

## 2023-12-07 NOTE — DIETITIAN INITIAL EVALUATION ADULT - NSFNSADHERENCEPTAFT_GEN_A_CORE
Prior to admission, pt's Granddaughter-in-Law stated the pt had a fair appetite and PO intake at baseline. Granddaughter-in-Law stated the pt followed a "soft" diet at home.

## 2023-12-07 NOTE — DIETITIAN INITIAL EVALUATION ADULT - ADD RECOMMEND
1) Nutrition plan of care to be in line with medical GOC and pt/family wishes. If PO diet warranted, recommend continue diet with no therapeutic restrictions as tolerated and within family's goals of care.   2) Obtain food preferences, honor as able.  3) RD remains available upon request and will follow up as able per protocol.

## 2023-12-07 NOTE — PROGRESS NOTE ADULT - PROBLEM SELECTOR PLAN 4
Multifactoral: hypoxia, increased O2 demands, medications Multifactoral including but not limited to  hypoxia, increased O2 demands, medications, CHF, hospitalization

## 2023-12-07 NOTE — PROGRESS NOTE ADULT - SUBJECTIVE AND OBJECTIVE BOX
PROGRESS NOTE:     Patient is a 89y old  Female who presents with a chief complaint of Hypoxia (06 Dec 2023 11:31)      SUBJECTIVE / OVERNIGHT EVENTS:    ADDITIONAL REVIEW OF SYSTEMS: 10 point ROS negative except per HPI    MEDICATIONS  (STANDING):  chlorhexidine 4% Liquid 1 Application(s) Topical daily    MEDICATIONS  (PRN):  acetaminophen     Tablet .. 650 milliGRAM(s) Oral every 6 hours PRN Temp greater or equal to 38C (100.4F), Mild Pain (1 - 3)  LORazepam   Injectable 0.5 milliGRAM(s) IV Push every 8 hours PRN Agitation  morphine  - Injectable 2 milliGRAM(s) IV Push every 3 hours PRN Moderate Pain (4 - 6)  morphine  - Injectable 2 milliGRAM(s) IV Push every 3 hours PRN dyspnea/use of accessory mucles  ondansetron Injectable 4 milliGRAM(s) IV Push every 8 hours PRN Nausea and/or Vomiting      CAPILLARY BLOOD GLUCOSE        I&O's Summary    06 Dec 2023 07:01  -  07 Dec 2023 07:00  --------------------------------------------------------  IN: 0 mL / OUT: 600 mL / NET: -600 mL        PHYSICAL EXAM:  Vital Signs Last 24 Hrs  T(C): --  T(F): --  HR: 100 (07 Dec 2023 03:30) (87 - 100)  BP: --  BP(mean): --  RR: 20 (07 Dec 2023 03:30) (20 - 20)  SpO2: 91% (07 Dec 2023 03:30) (91% - 97%)    Parameters below as of 07 Dec 2023 03:30  Patient On (Oxygen Delivery Method): nasal cannula, high flow  O2 Flow (L/min): 50  O2 Concentration (%): 50    CONSTITUTIONAL: NAD, well-developed, A&Ox3 to person, place, time.  RESPIRATORY: Normal respiratory effort; lungs are clear to auscultation bilaterally  CARDIOVASCULAR: Regular rate and rhythm, normal S1 and S2, no murmur/rub/gallop; No lower extremity edema; Peripheral pulses are 2+ bilaterally  ABDOMEN: Nontender to palpation, no rebound/guarding; No hepatosplenomegaly  MUSCLOSKELETAL: no clubbing or cyanosis of digits; no joint swelling or tenderness to palpation  NEURO: CN 2-12 grossly intact, moves all limbs spontaneously      LABS:                  -----    MICRO      -----    TRENDS  Hemoglobin: 9.8 g/dL (12-04 @ 18:19)    Creatinine Trend: 0.88<--  ----  RADIOLOGY & ADDITIONAL TESTS:  Results Reviewed:   Imaging Personally Reviewed:  Electrocardiogram Personally Reviewed:    COORDINATION OF CARE:  Care Discussed with Consultants/Other Providers [Y/N]:  Prior or Outpatient Records Reviewed [Y/N]:   PROGRESS NOTE:     Patient is a 89y old  Female who presents with a chief complaint of Hypoxia (06 Dec 2023 11:31)      SUBJECTIVE / OVERNIGHT EVENTS: No overnight events, pt appearing comfortable this AM.    ADDITIONAL REVIEW OF SYSTEMS: 10 point ROS negative except per HPI    MEDICATIONS  (STANDING):  chlorhexidine 4% Liquid 1 Application(s) Topical daily    MEDICATIONS  (PRN):  acetaminophen     Tablet .. 650 milliGRAM(s) Oral every 6 hours PRN Temp greater or equal to 38C (100.4F), Mild Pain (1 - 3)  LORazepam   Injectable 0.5 milliGRAM(s) IV Push every 8 hours PRN Agitation  morphine  - Injectable 2 milliGRAM(s) IV Push every 3 hours PRN Moderate Pain (4 - 6)  morphine  - Injectable 2 milliGRAM(s) IV Push every 3 hours PRN dyspnea/use of accessory mucles  ondansetron Injectable 4 milliGRAM(s) IV Push every 8 hours PRN Nausea and/or Vomiting      CAPILLARY BLOOD GLUCOSE        I&O's Summary    06 Dec 2023 07:01  -  07 Dec 2023 07:00  --------------------------------------------------------  IN: 0 mL / OUT: 600 mL / NET: -600 mL        PHYSICAL EXAM:  Vital Signs Last 24 Hrs  T(C): --  T(F): --  HR: 100 (07 Dec 2023 03:30) (87 - 100)  BP: --  BP(mean): --  RR: 20 (07 Dec 2023 03:30) (20 - 20)  SpO2: 91% (07 Dec 2023 03:30) (91% - 97%)    Parameters below as of 07 Dec 2023 03:30  Patient On (Oxygen Delivery Method): nasal cannula, high flow  O2 Flow (L/min): 50  O2 Concentration (%): 50    CONSTITUTIONAL: NAD, well-developed, A&Ox2  RESPIRATORY: on HFNC, Normal respiratory effort; lungs with b/l crackles  CARDIOVASCULAR: Regular rate and rhythm, normal S1 and S2, no murmur/rub/gallop; No lower extremity edema; Peripheral pulses are 2+ bilaterally  ABDOMEN: Nontender to palpation, no rebound/guarding; No hepatosplenomegaly  MUSCLOSKELETAL: no clubbing or cyanosis of digits; no joint swelling or tenderness to palpation  NEURO: CN 2-12 grossly intact, moves all limbs spontaneously      LABS:                  -----    MICRO      -----    TRENDS  Hemoglobin: 9.8 g/dL (12-04 @ 18:19)    Creatinine Trend: 0.88<--  ----  RADIOLOGY & ADDITIONAL TESTS:  Results Reviewed:   Imaging Personally Reviewed:  Electrocardiogram Personally Reviewed:    COORDINATION OF CARE:  Care Discussed with Consultants/Other Providers [Y/N]:  Prior or Outpatient Records Reviewed [Y/N]:

## 2023-12-07 NOTE — DIETITIAN INITIAL EVALUATION ADULT - PROBLEM SELECTOR PLAN 1
The patient was recently diagnosed with HFrEF in November. Latest EF at 32%. Patient family states she is adherence to outpatient regimen. The patient has begun to experience increasing LE edema over the past month with hypoxia down to 85% on 3L NC. CXR showed b/l pleural effusions and b/l reticular opacities. The patient's hypoxia is likely secondary to her underlying heart failure.    - Continue home carvedilol  - Hold home enalapril i/s/o potential hypotension   - Start IV lasix 40mg BID  - Echocardiogram in AM  - Standing daily weights  - Strict I/Os

## 2023-12-07 NOTE — PROGRESS NOTE ADULT - NS ATTEST RISK PROBLEM GEN_ALL_CORE FT
1. Number and complexity of problems addressed for this patient:    1.1 Moderate (At least 1)  [x ] 1 or more chronic illnesses with exacerbation, progression, or side effects of treatment  [ ] 2 or more stable chronic illnesses  [ ] 1 undiagnosed new problem with uncertain prognosis  [ ] 1 acute illness with systemic symptoms  [ ] 1 acute complicated injury  1.2 High (At least 1)   [ ] 1 or more chronic illnesses with severe exacerbation, progression, or side effects of treatment  [ ] 1 acute or chronic illnesses or injuries that may pose a threat to life or bodily function    2. Amount and/or Complexity of Data that was Reviewed and Analyzed for this case:       Moderate (1 out of 3)       High (2 out of 3)  2.1. (Any combination of 3 of the following)   [ ] Prior External notes were reviewed  [x ] Each test result was reviewed (see "LABS" and "RADIOLOGY & ADDITIONAL STUDIES" above)  [x ] The following tests were ordered and/or reviewed (Only count 1 point for ordering or reviewing a unique test):  	[ x]CBC  	[x ] Chemistry   	[ x] Imaging   	[ ] Other:   [ ] Assessment requiring an independent historian   		Name of historian and relationship:   2.2  [ ] Personally review and interpretation of  image or testing   2.3  [ ] Discussion of management or test interpretation with external physician/other qualified health care professional\appropriate source (not separately reported)    3. Risk of Complications and/or Morbidity or Mortality of for this Patient’s Management:  3.1 Moderate risk of morbidity from additional diagnostic testing or treatment (At least 1):   [ ] Prescription drug management   [ ] Decision regarding minor surgery, treatment, or procedure with identified patient or procedure risk factors  [ ] Decision regarding elective major surgery, treatment, or procedure without identified patient or procedure risk factors   [ ] Diagnosis or treatment significantly limited by social determinants of health   [ ] Other:   3.2 High risk of morbidity from additional diagnostic testing or treatment (At least 1):   [ x] Drug therapy requiring intensive monitoring for toxicity   [ ] Decision regarding elective major surgery, treatment, or procedure with identified patient or procedure risk factors   [ ] Decision regarding emergency major surgery, treatment, or procedure   [ ] Decision regarding hospitalization or escalation of hospital-level of care  [ ] Decision not to resuscitate, not to intubate, or to de-escalate care because of poor prognosis   [ ] Decision to proceed or not with artificial nutrition   [ x] Parenteral controlled substance  [ ] Other:

## 2023-12-07 NOTE — PROGRESS NOTE ADULT - PROBLEM SELECTOR PLAN 5
AWaits bed in PCU Awaits bed in PCU  [facility] 10%        PARUL Miles  Please contact me via Teams  between 6am-2pm. If not answering, please call the palliative care pager [phone]    After 2pm and on weekends, please see the contact information below:    In the event of newly developing, evolving, or worsening symptoms between 2pm and 5pm please contact the Palliative Medicine via extension [phone] for assistance.  After 5pm please contact team via pager (if the patient is at [facility] #[phone] or if the patient is at [facility] #[phone]) The Geriatric and Palliative Medicine service has coverage 24 hours a day/ 7 days a week to provide medical recommendations regarding symptom management needs via telephone Awaits bed in PCU  [facility] 10%        PARUL Miles  Please contact me via Teams  between 6am-2pm. If not answering, please call the palliative care pager [phone]    After 2pm and on weekends, please see the contact information below:    In the event of newly developing, evolving, or worsening symptoms between 2pm and 5pm please contact the Palliative Medicine via extension [phone] for assistance.  After 5pm please contact team via pager (if the patient is at [facility] #[phone] or if the patient is at [facility] #[phone]) The Geriatric and Palliative Medicine service has coverage 24 hours a day/ 7 days a week to provide medical recommendations regarding symptom management needs via telephone

## 2023-12-07 NOTE — PROGRESS NOTE ADULT - PROBLEM SELECTOR PROBLEM 5
Encounter for palliative care
HTN (hypertension)
Encounter for palliative care
HTN (hypertension)
HTN (hypertension)

## 2023-12-07 NOTE — DIETITIAN INITIAL EVALUATION ADULT - ORAL INTAKE PTA/DIET HISTORY
Complete nutrition assessment inappropriate at this time, as pt has been made full comfort on 12/5 (per chart). Per chart, "Awaits bed in PCU." Limited subjective information obtained from pt's Granddaughter-in-Law (Maria Fernanda) at bedside. Pt's Granddaughter-in-Law reported poor appetite and PO intake since Monday (12/4), stated the pt has not been eating. Granddaughter-in-Law reported at baseline, the pt has always been a picky eater, however stated she was eating "everything" at home prior to Monday 12/4. Granddaughter-in-Law endorsed recent weight gain in setting of CHF, stated pt's UBW is ~100 pounds, stated the pt currently weighs ~114-118 pounds. Confirmed no known food allergies.  Full nutrition assessment inappropriate at this time, as pt has been made full comfort on 12/5 (per chart). Per chart, "Awaits bed in PCU." Limited subjective information obtained from pt's Granddaughter-in-Law (Maria Fernanda) at bedside. Pt's Granddaughter-in-Law reported poor appetite and PO intake since Monday (12/4), stated the pt has not been eating. Granddaughter-in-Law reported at baseline, the pt has always been a picky eater, however stated she was eating "everything" at home prior to Monday 12/4. Granddaughter-in-Law endorsed recent weight gain in setting of CHF, stated pt's UBW is ~100 pounds, stated the pt currently weighs ~114-118 pounds. Confirmed no known food allergies.

## 2023-12-07 NOTE — PROGRESS NOTE ADULT - PROBLEM SELECTOR PROBLEM 1
Acute on chronic systolic congestive heart failure
Functional quadriplegia
Acute on chronic systolic congestive heart failure
Functional quadriplegia
Acute on chronic systolic congestive heart failure

## 2023-12-07 NOTE — DIETITIAN INITIAL EVALUATION ADULT - PROBLEM SELECTOR PLAN 7
The patient has history of hyperlipidemia, currently not receiving medications.    - Lipid profile in AM  - Encourage initiation of statin in outpatient setting

## 2023-12-07 NOTE — PROGRESS NOTE ADULT - PROBLEM SELECTOR PLAN 3
Pleasure feeds only while awake  Family aware of risk of aspiration and even death.  Instructed family on safe feeding techniques including HOB 45 deg  NPO d/c  STarted easy to chew diet Pleasure feeds only while awake  Family aware of risk of aspiration and even death.  Instructed family on safe feeding techniques including HOB 45 deg  NPO d/c

## 2023-12-07 NOTE — PROGRESS NOTE ADULT - PROBLEM SELECTOR PLAN 1
PPS <10% needs assistance with all basic needs   Pt   lives alone  in a first floor apt  and step son lives on the upper  floor. Prior to hospitalization patient was ndependent with adls and used a walker to assist with  ambulation. PPS <10% needs assistance with all basic needs   Pt   lives alone  in a first floor apt  and step son lives on the upper  floor. Prior to hospitalization patient was independent with adls and used a walker to assist with  ambulation.  Pt requiring full supportive care

## 2023-12-07 NOTE — PROGRESS NOTE ADULT - ASSESSMENT
The patient is a 89yo woman with a history of dementia, HTN, HLD, bradycardia s/p PPM, and CHF presenting with hypoxia likely secondary to CHF exacerbation. Made full comfort on 12/5.

## 2023-12-07 NOTE — DIETITIAN INITIAL EVALUATION ADULT - PROBLEM SELECTOR PLAN 3
The patient has a known history of dementia with waxing and waning cognition. She has had a notable decline in her ability to perform her ADLs over the past month per her family.    - PT/OT eval  - Fall/aspiration precautions  - Palliative consult per family's request  - Continue GOC conversation, patient currently DNR/DNI

## 2023-12-07 NOTE — DIETITIAN INITIAL EVALUATION ADULT - ENERGY INTAKE
Currently, pt's Granddaughter-in-Law stated the pt has been eating poorly in-house. Pt's Granddaughter-in-Law stated the pt will occasionally take sips of liquids when she is awake. RD offered to obtain food preferences within family's wishes to assist pt's family with pleasure feeds, pt's Granddaughter-in-Law declined preferences at this time. Pt's Granddaughter-in-Law aware RD remains available.  Poor (<50%)

## 2023-12-07 NOTE — PROGRESS NOTE ADULT - PROBLEM SELECTOR PLAN 2
In setting of CHF exacerbation   CXR noted with b/l perihilar opacities andsmall b/l pleural effusions l>r  Currently requiring hiflo, management per primary team  Continue with Morphine as ordered  Utilized one dose for dyspnea In setting of CHF exacerbation   CXR noted with b/l perihilar opacities and small b/l pleural effusions l>r  Currently requiring hiflo, management per primary team  Continue with Morphine prn will add ATC  Utilized 4 doses for dyspnea in 24 hrs

## 2023-12-07 NOTE — PROGRESS NOTE ADULT - PROBLEM SELECTOR PLAN 6
Family at bedside remain in agreement with continued symptom directed approach and awaits bed availability in PCU.  BP stable as stated above Family at bedside remain in agreement with continued symptom directed approach and awaits bed availability in PCU.  BP stable as stated above.  Met with Dtr in law at bedside for greater then 16 mins discussing pt and transfer to PCU.  Introduced child life services as Dtr in law has a 9 and 6 year old and may benefit from services.  She reports that she will speak with  and let us know. pt awaits transfer to PCU when bed available.

## 2023-12-08 NOTE — DISCHARGE NOTE FOR THE EXPIRED PATIENT - HOSPITAL COURSE
89yo woman with a history of dementia, HTN, HLD, bradycardia s/p PPM, and CHF presenting with hypoxia likely secondary to CHF exacerbation . [facility] team consulted for GOC. The patient was transferred to the PCU for symptom management. The patient  on 23 at 06:39AM. 89yo woman with a history of dementia, HTN, HLD, bradycardia s/p PPM, and CHF presenting with hypoxia likely secondary to CHF exacerbation. [facility] team consulted for GOC. The patient was transferred to the PCU for symptom management. The patient  on 23 at 06:39AM. 89yo woman with a history of dementia, HTN, HLD, bradycardia s/p PPM, and CHF presenting with hypoxia likely secondary to CHF exacerbation. [facility] team consulted for GOC. The patient was transferred to the PCU for symptom management. The patient  on 23 at 06:39AM.

## 2023-12-13 PROCEDURE — 85610 PROTHROMBIN TIME: CPT

## 2023-12-13 PROCEDURE — 96374 THER/PROPH/DIAG INJ IV PUSH: CPT

## 2023-12-13 PROCEDURE — 85730 THROMBOPLASTIN TIME PARTIAL: CPT

## 2023-12-13 PROCEDURE — 84100 ASSAY OF PHOSPHORUS: CPT

## 2023-12-13 PROCEDURE — 71045 X-RAY EXAM CHEST 1 VIEW: CPT

## 2023-12-13 PROCEDURE — 84484 ASSAY OF TROPONIN QUANT: CPT

## 2023-12-13 PROCEDURE — 99291 CRITICAL CARE FIRST HOUR: CPT | Mod: 25

## 2023-12-13 PROCEDURE — 82962 GLUCOSE BLOOD TEST: CPT

## 2023-12-13 PROCEDURE — 83880 ASSAY OF NATRIURETIC PEPTIDE: CPT

## 2023-12-13 PROCEDURE — 85025 COMPLETE CBC W/AUTO DIFF WBC: CPT

## 2023-12-13 PROCEDURE — 80053 COMPREHEN METABOLIC PANEL: CPT

## 2023-12-13 PROCEDURE — 83735 ASSAY OF MAGNESIUM: CPT

## 2025-06-13 NOTE — DIETITIAN INITIAL EVALUATION ADULT - FEEDING ASSISTANCE
[Joint Pain] : joint pain
[Negative] : Heme/Lymph
REQUIRED- Click to run Sepsis Recognition Calculator
Provide feeding assistance as needed and within family's GOC/wishes.